# Patient Record
Sex: FEMALE | Race: BLACK OR AFRICAN AMERICAN | Employment: FULL TIME | URBAN - METROPOLITAN AREA
[De-identification: names, ages, dates, MRNs, and addresses within clinical notes are randomized per-mention and may not be internally consistent; named-entity substitution may affect disease eponyms.]

---

## 2017-02-24 ENCOUNTER — TRANSCRIBE ORDERS (OUTPATIENT)
Dept: ADMINISTRATIVE | Facility: HOSPITAL | Age: 57
End: 2017-02-24

## 2017-02-24 DIAGNOSIS — Z12.31 ENCOUNTER FOR MAMMOGRAM TO ESTABLISH BASELINE MAMMOGRAM: Primary | ICD-10-CM

## 2017-02-28 ENCOUNTER — ALLSCRIPTS OFFICE VISIT (OUTPATIENT)
Dept: OTHER | Facility: OTHER | Age: 57
End: 2017-02-28

## 2017-03-02 ENCOUNTER — LAB CONVERSION - ENCOUNTER (OUTPATIENT)
Dept: OTHER | Facility: OTHER | Age: 57
End: 2017-03-02

## 2017-03-02 LAB
ADDITIONAL INFORMATION (HISTORICAL): NORMAL
ADEQUACY: (HISTORICAL): NORMAL
COMMENT (HISTORICAL): NORMAL
CYTOTECHNOLOGIST: (HISTORICAL): NORMAL
HPV MRNA E6/E7 (HISTORICAL): NOT DETECTED
INTERPRETATION (HISTORICAL): NORMAL
LMP (HISTORICAL): NORMAL
PREV. BX: (HISTORICAL): NORMAL
PREV. PAP (HISTORICAL): NORMAL
SOURCE (HISTORICAL): NORMAL

## 2017-03-22 ENCOUNTER — HOSPITAL ENCOUNTER (OUTPATIENT)
Dept: RADIOLOGY | Facility: HOSPITAL | Age: 57
Discharge: HOME | End: 2017-03-22
Attending: OBSTETRICS & GYNECOLOGY
Payer: COMMERCIAL

## 2017-03-22 DIAGNOSIS — Z12.31 ENCOUNTER FOR SCREENING MAMMOGRAM FOR MALIGNANT NEOPLASM OF BREAST: ICD-10-CM

## 2017-03-22 PROCEDURE — G0202 SCR MAMMO BI INCL CAD: HCPCS

## 2018-01-04 ENCOUNTER — TRANSCRIBE ORDERS (OUTPATIENT)
Dept: ADMINISTRATIVE | Facility: HOSPITAL | Age: 58
End: 2018-01-04

## 2018-01-04 DIAGNOSIS — Z12.39 SCREENING BREAST EXAMINATION: Primary | ICD-10-CM

## 2018-01-12 VITALS
SYSTOLIC BLOOD PRESSURE: 128 MMHG | BODY MASS INDEX: 31.4 KG/M2 | WEIGHT: 188.5 LBS | DIASTOLIC BLOOD PRESSURE: 74 MMHG | HEIGHT: 65 IN

## 2018-03-13 ENCOUNTER — OFFICE VISIT (OUTPATIENT)
Dept: OBGYN CLINIC | Facility: CLINIC | Age: 58
End: 2018-03-13
Payer: COMMERCIAL

## 2018-03-13 VITALS
DIASTOLIC BLOOD PRESSURE: 72 MMHG | BODY MASS INDEX: 26.68 KG/M2 | HEIGHT: 66 IN | WEIGHT: 166 LBS | SYSTOLIC BLOOD PRESSURE: 126 MMHG

## 2018-03-13 DIAGNOSIS — Z01.419 PAP SMEAR, AS PART OF ROUTINE GYNECOLOGICAL EXAMINATION: Primary | ICD-10-CM

## 2018-03-13 DIAGNOSIS — Z12.31 VISIT FOR SCREENING MAMMOGRAM: ICD-10-CM

## 2018-03-13 DIAGNOSIS — Z01.419 ENCOUNTER FOR GYNECOLOGICAL EXAMINATION: ICD-10-CM

## 2018-03-13 PROCEDURE — G0145 SCR C/V CYTO,THINLAYER,RESCR: HCPCS | Performed by: OBSTETRICS & GYNECOLOGY

## 2018-03-13 PROCEDURE — 99396 PREV VISIT EST AGE 40-64: CPT | Performed by: OBSTETRICS & GYNECOLOGY

## 2018-03-13 RX ORDER — FLUTICASONE PROPIONATE 50 MCG
1 SPRAY, SUSPENSION (ML) NASAL EVERY 24 HOURS
COMMUNITY
Start: 2013-01-16

## 2018-03-13 RX ORDER — LORATADINE 10 MG/1
TABLET ORAL
COMMUNITY

## 2018-03-13 RX ORDER — ASCORBATE CALCIUM 500 MG
500 TABLET ORAL
COMMUNITY

## 2018-03-13 RX ORDER — NIFEDIPINE 60 MG/1
TABLET, FILM COATED, EXTENDED RELEASE ORAL
COMMUNITY
Start: 2017-12-15

## 2018-03-13 RX ORDER — LISINOPRIL 30 MG/1
TABLET ORAL
COMMUNITY
Start: 2018-01-10

## 2018-03-13 NOTE — PATIENT INSTRUCTIONS
Normal gynecologic evaluation    Self-breast examinations stressed to the patient    Mammogram ordered    Patient will be seen in 1 year for her annual gynecologic medical examination    Patient will call the office should any problems or concerns arise during the interim

## 2018-03-13 NOTE — PROGRESS NOTES
Assessment/Plan:    Assessment:  Annual gynecologic and medical examination    Plan:  Normal gynecologic physical examination today    Self-breast examination with stressed to the patient    Mammogram was ordered    Pap smear was completed    I discussed the importance of regular exercise and good eating habits with the patient  I also reviewed the importance of a multiple vitamin and taking an adequate amount of calcium and vitamin-D  We also discussed the importance of using sun block when out in the [place] for prolonged amount of time    She will be seen in 1 year for her annual gynecologic and medical evaluation    And she was told to call the office should any problems or concerns arise during the interim    Questions were answered in detail for her during the visit      No problem-specific Assessment & Plan notes found for this encounter  Diagnoses and all orders for this visit:    Pap smear, as part of routine gynecological examination  -     Liquid-based pap, screening    Visit for screening mammogram    Encounter for gynecological examination    Other orders  -     Cancel: Mammo screening bilateral w cad; Future  -     loratadine (CLARITIN) 10 mg tablet; Take by mouth  -     Calcium Ascorbate 500 MG TABS; Take 500 mg by mouth  -     Biotin 5 MG TBDP; Take 2 tablets by mouth  -     Calcium Carb-Cholecalciferol (CALCIUM 1000 + D) 1000-800 MG-UNIT TABS; Take by mouth  -     Multiple Vitamin (MULTI VITAMIN DAILY PO); Take by mouth  -     Cholecalciferol 1000 units CHEW; Chew  -     fluticasone (FLONASE) 50 mcg/act nasal spray; 1 spray into each nostril every 24 hours  -     lisinopril (ZESTRIL) 30 mg tablet;   -     NIFEdipine ER (ADALAT CC) 60 MG 24 hr tablet;   -     Cyanocobalamin (VITAMIN B 12 PO); Take by mouth          Subjective:      Patient ID: Markie Betancourt is a 66 y o  female      Patient is a 61-year-old black female who is here today for her annual gynecologic and medical examination    She denies any abnormal vaginal bleeding    She also denies any significant pelvic or abdominal pain    She reports normal bowel and bladder habits    She states that she is caught up with colonoscopies    She reports that she has not had any new medical problems throughout the past year          Gynecologic Exam         The following portions of the patient's history were reviewed and updated as appropriate: allergies, current medications, past family history, past medical history, past social history, past surgical history and problem list     Review of Systems   Constitutional: Negative  HENT: Negative  Eyes: Negative  Respiratory: Negative  Cardiovascular: Negative  Gastrointestinal: Negative  Endocrine: Negative  Genitourinary: Negative  Musculoskeletal: Negative  Skin: Negative  Neurological: Negative  Psychiatric/Behavioral: Negative  Objective:      /72 (BP Location: Right arm, Patient Position: Sitting, Cuff Size: Standard)   Ht 5' 6" (1 676 m)   Wt 75 3 kg (166 lb)   BMI 26 79 kg/m²          Physical Exam   Constitutional: She appears well-developed  HENT:   Head: Normocephalic  Eyes: Pupils are equal, round, and reactive to light  Neck: Normal range of motion  Neck supple  Cardiovascular: Normal rate and regular rhythm  Pulmonary/Chest: Effort normal and breath sounds normal    Abdominal: Soft  Normal appearance and bowel sounds are normal    Genitourinary: Rectum normal, vagina normal and uterus normal  Rectal exam shows guaiac negative stool  Pelvic exam was performed with patient supine  Right adnexum displays no mass, no tenderness and no fullness  Left adnexum displays no mass, no tenderness and no fullness  No vaginal discharge found  Lymphadenopathy:     She has no cervical adenopathy  She has no axillary adenopathy  Right: No inguinal and no supraclavicular adenopathy present          Left: No inguinal and no supraclavicular adenopathy present  Neurological: She is alert  Skin: Skin is intact  No rash noted  Psychiatric: She has a normal mood and affect   Her speech is normal and behavior is normal  Judgment and thought content normal  Cognition and memory are normal

## 2018-03-19 LAB
LAB AP GYN PRIMARY INTERPRETATION: NORMAL
Lab: NORMAL

## 2018-03-26 ENCOUNTER — HOSPITAL ENCOUNTER (OUTPATIENT)
Dept: RADIOLOGY | Facility: HOSPITAL | Age: 58
Discharge: HOME | End: 2018-03-26
Payer: COMMERCIAL

## 2018-03-26 DIAGNOSIS — Z12.39 SCREENING BREAST EXAMINATION: ICD-10-CM

## 2018-03-26 PROCEDURE — 77067 SCR MAMMO BI INCL CAD: CPT

## 2019-04-09 ENCOUNTER — ANNUAL EXAM (OUTPATIENT)
Dept: OBGYN CLINIC | Facility: CLINIC | Age: 59
End: 2019-04-09
Payer: COMMERCIAL

## 2019-04-09 VITALS — BODY MASS INDEX: 26.53 KG/M2 | SYSTOLIC BLOOD PRESSURE: 110 MMHG | WEIGHT: 164.4 LBS | DIASTOLIC BLOOD PRESSURE: 76 MMHG

## 2019-04-09 DIAGNOSIS — Z01.419 PAP SMEAR, AS PART OF ROUTINE GYNECOLOGICAL EXAMINATION: Primary | ICD-10-CM

## 2019-04-09 DIAGNOSIS — Z12.39 BREAST CANCER SCREENING: ICD-10-CM

## 2019-04-09 DIAGNOSIS — Z01.419 ENCOUNTER FOR GYNECOLOGICAL EXAMINATION: ICD-10-CM

## 2019-04-09 PROCEDURE — 99396 PREV VISIT EST AGE 40-64: CPT | Performed by: OBSTETRICS & GYNECOLOGY

## 2019-04-09 RX ORDER — LISINOPRIL 30 MG/1
TABLET ORAL
COMMUNITY
Start: 2019-01-05

## 2019-04-09 RX ORDER — FEXOFENADINE HCL 180 MG/1
180 TABLET ORAL
COMMUNITY

## 2019-04-09 RX ORDER — NIFEDIPINE 60 MG/1
TABLET, FILM COATED, EXTENDED RELEASE ORAL
COMMUNITY
Start: 2018-12-10

## 2019-04-10 LAB
CLINICAL INFO: NORMAL
CYTO CVX: NORMAL
DATE PREVIOUS BX: NORMAL
LMP START DATE: NORMAL
SL AMB PREV. PAP:: NORMAL
SPECIMEN SOURCE CVX/VAG CYTO: NORMAL

## 2022-08-11 ENCOUNTER — OFFICE VISIT (OUTPATIENT)
Dept: OBGYN CLINIC | Facility: CLINIC | Age: 62
End: 2022-08-11
Payer: COMMERCIAL

## 2022-08-11 VITALS
SYSTOLIC BLOOD PRESSURE: 138 MMHG | DIASTOLIC BLOOD PRESSURE: 90 MMHG | BODY MASS INDEX: 30.63 KG/M2 | WEIGHT: 190.6 LBS | HEIGHT: 66 IN | HEART RATE: 80 BPM

## 2022-08-11 DIAGNOSIS — M16.11 ARTHRITIS OF RIGHT HIP: Primary | ICD-10-CM

## 2022-08-11 DIAGNOSIS — Z01.818 PREOPERATIVE TESTING: ICD-10-CM

## 2022-08-11 DIAGNOSIS — Z01.818 ENCOUNTER FOR PREADMISSION TESTING: ICD-10-CM

## 2022-08-11 PROCEDURE — 99204 OFFICE O/P NEW MOD 45 MIN: CPT | Performed by: ORTHOPAEDIC SURGERY

## 2022-08-11 RX ORDER — MULTIVITAMIN
1 TABLET ORAL DAILY
Qty: 30 TABLET | Refills: 1 | Status: SHIPPED | OUTPATIENT
Start: 2022-08-11 | End: 2022-08-26

## 2022-08-11 RX ORDER — ASCORBIC ACID 500 MG
500 TABLET ORAL DAILY
Qty: 30 TABLET | Refills: 1 | Status: SHIPPED | OUTPATIENT
Start: 2022-08-11

## 2022-08-11 RX ORDER — FOLIC ACID 1 MG/1
1 TABLET ORAL DAILY
Qty: 30 TABLET | Refills: 1 | Status: SHIPPED | OUTPATIENT
Start: 2022-08-11

## 2022-08-11 RX ORDER — FERROUS SULFATE TAB EC 324 MG (65 MG FE EQUIVALENT) 324 (65 FE) MG
324 TABLET DELAYED RESPONSE ORAL
Qty: 30 TABLET | Refills: 1 | Status: SHIPPED | OUTPATIENT
Start: 2022-08-11

## 2022-08-11 NOTE — PROGRESS NOTES
Assessment/Plan     1  Arthritis of right hip    2  Preoperative testing    3  Encounter for preadmission testing      Orders Placed This Encounter   Procedures    XR hip/pelv 2-3 vws right if performed    HEMOGLOBIN A1C W/ EAG ESTIMATION    Comprehensive metabolic panel    Hemoglobin A1C W/EAG Estimation    CBC and differential    Anemia Panel w/Reflex    Protime-INR    APTT    Ambulatory referral to Physical Therapy    Ambulatory referral to Cardiology    Type and screen     Edison Leong has severe right hip arthritis  she has tried conservative treatment without adequate relief  We discussed treatment options as well as risks and benefits of treatment options  The patient would like to proceed with a right Anterior  total hip arthroplasty  The risks of surgery include, but are not limited to infection, blood clot, wound healing problems, blood loss, damage to blood vessels and nerves, persistent pain and stiffness, dislocation, fracture, leg-length discrepancy, need for additional surgery, need for revision surgery, failure of hardware, heart attack, stroke, death  The patient understood and agreed to by oral and written consent  I answered all questions regarding surgery (potential same day surgery)   The patient will be on  BID  for DVT prophylaxis  · The patient will obtain clearance from their PCP and cardiology( she obtained clearance already  · She will be on vitamins prior to surgery   · She does have history of constipation after having right total knee arthroplasty   · She had her recent blood work done at [facility]   Return for PO Right anterior BRENNON   I answered all of the patient's questions during the visit and provided education of the patient's condition during the visit  The patient verbalized understanding of the information given and agrees with the plan  This note was dictated using [employer] software  It may contain errors including improperly dictated words  Please contact physician directly for any questions  History of Present Illness   Chief complaint:   Chief Complaint   Patient presents with    Right Hip - Pain       HPI: Suleiman Banegas is a 63 y o  female that c/o right hip pain  She was referred by her PCP Dr Randy Campbell  She was treating with Dr Violet Pierre at [address] and has history of left total hip posterior arthroplasty 5/2021 and right total knee arthroplasty 6/2020  She was scheduled for right total hip arthroplasty on Monday 8/15/22 but got canceled due to her insurance not covered under Dr Manisha Coon  She is having pain that comes and goes over the lateral right hip and occasional has right knee pain  Pain is worse with prolong weight bearing and with activities  She feels her left leg is longer and feels off balance when walking  She feels the pain is limited her from daily activities of living  She did go to physical therapy for almost 2 years with no relief  She was taking Aleve for pain but stopped per her PCP due to causing her blood pressure to go up  She has taken IBU with no issues with her blood pressure  She is taking Tylenol arthritis with relief  She has no history of having injections or surgeries on the right hip  History of MRSA: no  History of blood clots: no  Family history of blood clots: no  History of Hepatitis C:no  History of HIV: no  Are you a smoker:no  Are you diabetic:no  Do you see a cardiologist: yes/ pre op clearance   Have you been vaccinated for COVID:yes  Have you seen a dentist in the past year: yes  Do you have a leg length discrepancy:  Yes/ right leg feels shorter              ROS:    See HPI for musculoskeletal review     All other systems reviewed are negative     Historical Information   Past Medical History:   Diagnosis Date    Spontaneous miscarriage      Past Surgical History:   Procedure Laterality Date    FOOT SURGERY      TOTAL ABDOMINAL HYSTERECTOMY  11/2007    with removal of both ovaries     Social History   Social History     Substance and Sexual Activity   Alcohol Use Yes    Comment: Social use     Social History     Substance and Sexual Activity   Drug Use No     Social History     Tobacco Use   Smoking Status Former Smoker   Smokeless Tobacco Never Used     Family History:   Family History   Problem Relation Age of Onset    Other Father         Epilepsy    Other Sister         Cyst, breast; Thyroid cyst    Thyroid disease Sister     Heart failure Maternal Grandfather     Stroke Maternal Grandfather     Diabetes Family     Heart disease Family        Current Outpatient Medications on File Prior to Visit   Medication Sig Dispense Refill    Biotin 5 MG TBDP Take 2 tablets by mouth      Calcium Ascorbate 500 MG TABS Take 500 mg by mouth      Calcium Carb-Cholecalciferol (CALCIUM 1000 + D) 1000-800 MG-UNIT TABS Take by mouth      Cholecalciferol 1000 units CHEW Chew      Cholecalciferol 1000 units CHEW Chew      Cyanocobalamin (VITAMIN B 12 PO) Take by mouth      fexofenadine (ALLEGRA) 180 MG tablet Take 180 mg by mouth      fluticasone (FLONASE) 50 mcg/act nasal spray 1 spray into each nostril every 24 hours      lisinopril (ZESTRIL) 30 mg tablet       lisinopril (ZESTRIL) 30 mg tablet TAKE 1 TABLET DAILY      loratadine (CLARITIN) 10 mg tablet Take by mouth      LUTEIN PO Take by mouth      Multiple Vitamin (MULTI VITAMIN DAILY PO) Take by mouth      NIFEdipine ER (ADALAT CC) 60 MG 24 hr tablet       NIFEdipine ER (ADALAT CC) 60 MG 24 hr tablet TAKE 1 TABLET DAILY      Omega-3 Fatty Acids (FISH OIL PO) Take 2 g by mouth       No current facility-administered medications on file prior to visit  No Known Allergies    Objective   Vitals: Blood pressure 138/90, pulse 80, height 5' 5 5" (1 664 m), weight 86 5 kg (190 lb 9 6 oz)  ,Body mass index is 31 24 kg/m²      PE:  AAOx 3  WDWN  Hearing intact, no drainage from eyes  Regular rate  no audible wheezing  no abdominal distension  LE compartments soft, skin intact    right hip:   No dislocation/deformity  ROM:FF 0-90  ER 0-5   IR -5    Leg lengths:  a cm shorter on the right with 10 degrees flexion contracture         Imaging Studies: I have personally reviewed pertinent films in PACS  righthip:  Severe DJD

## 2022-08-11 NOTE — H&P (VIEW-ONLY)
Assessment/Plan     1  Arthritis of right hip    2  Preoperative testing    3  Encounter for preadmission testing      Orders Placed This Encounter   Procedures    XR hip/pelv 2-3 vws right if performed    HEMOGLOBIN A1C W/ EAG ESTIMATION    Comprehensive metabolic panel    Hemoglobin A1C W/EAG Estimation    CBC and differential    Anemia Panel w/Reflex    Protime-INR    APTT    Ambulatory referral to Physical Therapy    Ambulatory referral to Cardiology    Type and screen     Michael Lofton has severe right hip arthritis  she has tried conservative treatment without adequate relief  We discussed treatment options as well as risks and benefits of treatment options  The patient would like to proceed with a right Anterior  total hip arthroplasty  The risks of surgery include, but are not limited to infection, blood clot, wound healing problems, blood loss, damage to blood vessels and nerves, persistent pain and stiffness, dislocation, fracture, leg-length discrepancy, need for additional surgery, need for revision surgery, failure of hardware, heart attack, stroke, death  The patient understood and agreed to by oral and written consent  I answered all questions regarding surgery (potential same day surgery)   The patient will be on  BID  for DVT prophylaxis  · The patient will obtain clearance from their PCP and cardiology( she obtained clearance already  · She will be on vitamins prior to surgery   · She does have history of constipation after having right total knee arthroplasty   · She had her recent blood work done at [facility]   Return for PO Right anterior BRENNON   I answered all of the patient's questions during the visit and provided education of the patient's condition during the visit  The patient verbalized understanding of the information given and agrees with the plan  This note was dictated using [employer] software  It may contain errors including improperly dictated words  Please contact physician directly for any questions  History of Present Illness   Chief complaint:   Chief Complaint   Patient presents with    Right Hip - Pain       HPI: Suleiman Banegas is a 63 y o  female that c/o right hip pain  She was referred by her PCP Dr Randy Campbell  She was treating with Dr Violet Pierre at [address] and has history of left total hip posterior arthroplasty 5/2021 and right total knee arthroplasty 6/2020  She was scheduled for right total hip arthroplasty on Monday 8/15/22 but got canceled due to her insurance not covered under Dr Manisha Coon  She is having pain that comes and goes over the lateral right hip and occasional has right knee pain  Pain is worse with prolong weight bearing and with activities  She feels her left leg is longer and feels off balance when walking  She feels the pain is limited her from daily activities of living  She did go to physical therapy for almost 2 years with no relief  She was taking Aleve for pain but stopped per her PCP due to causing her blood pressure to go up  She has taken IBU with no issues with her blood pressure  She is taking Tylenol arthritis with relief  She has no history of having injections or surgeries on the right hip  History of MRSA: no  History of blood clots: no  Family history of blood clots: no  History of Hepatitis C:no  History of HIV: no  Are you a smoker:no  Are you diabetic:no  Do you see a cardiologist: yes/ pre op clearance   Have you been vaccinated for COVID:yes  Have you seen a dentist in the past year: yes  Do you have a leg length discrepancy:  Yes/ right leg feels shorter              ROS:    See HPI for musculoskeletal review     All other systems reviewed are negative     Historical Information   Past Medical History:   Diagnosis Date    Spontaneous miscarriage      Past Surgical History:   Procedure Laterality Date    FOOT SURGERY      TOTAL ABDOMINAL HYSTERECTOMY  11/2007    with removal of both ovaries     Social History   Social History     Substance and Sexual Activity   Alcohol Use Yes    Comment: Social use     Social History     Substance and Sexual Activity   Drug Use No     Social History     Tobacco Use   Smoking Status Former Smoker   Smokeless Tobacco Never Used     Family History:   Family History   Problem Relation Age of Onset    Other Father         Epilepsy    Other Sister         Cyst, breast; Thyroid cyst    Thyroid disease Sister     Heart failure Maternal Grandfather     Stroke Maternal Grandfather     Diabetes Family     Heart disease Family        Current Outpatient Medications on File Prior to Visit   Medication Sig Dispense Refill    Biotin 5 MG TBDP Take 2 tablets by mouth      Calcium Ascorbate 500 MG TABS Take 500 mg by mouth      Calcium Carb-Cholecalciferol (CALCIUM 1000 + D) 1000-800 MG-UNIT TABS Take by mouth      Cholecalciferol 1000 units CHEW Chew      Cholecalciferol 1000 units CHEW Chew      Cyanocobalamin (VITAMIN B 12 PO) Take by mouth      fexofenadine (ALLEGRA) 180 MG tablet Take 180 mg by mouth      fluticasone (FLONASE) 50 mcg/act nasal spray 1 spray into each nostril every 24 hours      lisinopril (ZESTRIL) 30 mg tablet       lisinopril (ZESTRIL) 30 mg tablet TAKE 1 TABLET DAILY      loratadine (CLARITIN) 10 mg tablet Take by mouth      LUTEIN PO Take by mouth      Multiple Vitamin (MULTI VITAMIN DAILY PO) Take by mouth      NIFEdipine ER (ADALAT CC) 60 MG 24 hr tablet       NIFEdipine ER (ADALAT CC) 60 MG 24 hr tablet TAKE 1 TABLET DAILY      Omega-3 Fatty Acids (FISH OIL PO) Take 2 g by mouth       No current facility-administered medications on file prior to visit  No Known Allergies    Objective   Vitals: Blood pressure 138/90, pulse 80, height 5' 5 5" (1 664 m), weight 86 5 kg (190 lb 9 6 oz)  ,Body mass index is 31 24 kg/m²      PE:  AAOx 3  WDWN  Hearing intact, no drainage from eyes  Regular rate  no audible wheezing  no abdominal distension  LE compartments soft, skin intact    right hip:   No dislocation/deformity  ROM:FF 0-90  ER 0-5   IR -5    Leg lengths:  a cm shorter on the right with 10 degrees flexion contracture         Imaging Studies: I have personally reviewed pertinent films in PACS  righthip:  Severe DJD

## 2022-08-11 NOTE — PROGRESS NOTES
Assessment/Plan     No diagnosis found  No orders of the defined types were placed in this encounter  ·     No follow-ups on file  I answered all of the patient's questions during the visit and provided education of the patient's condition during the visit  The patient verbalized understanding of the information given and agrees with the plan  This note was dictated using [employer] software  It may contain errors including improperly dictated words  Please contact physician directly for any questions  History of Present Illness   Chief complaint:   Chief Complaint   Patient presents with    Right Hip - Pain       HPI: Daiana Rios is a 63 y o  female that c/o right hip pain  ROS:    See HPI for musculoskeletal review     All other systems reviewed are negative     Historical Information   Past Medical History:   Diagnosis Date    Spontaneous miscarriage      Past Surgical History:   Procedure Laterality Date    FOOT SURGERY      TOTAL ABDOMINAL HYSTERECTOMY  11/2007    with removal of both ovaries     Social History   Social History     Substance and Sexual Activity   Alcohol Use Yes    Comment: Social use     Social History     Substance and Sexual Activity   Drug Use No     Social History     Tobacco Use   Smoking Status Former Smoker   Smokeless Tobacco Never Used     Family History:   Family History   Problem Relation Age of Onset    Other Father         Epilepsy    Other Sister         Cyst, breast; Thyroid cyst    Thyroid disease Sister     Heart failure Maternal Grandfather     Stroke Maternal Grandfather     Diabetes Family     Heart disease Family        Current Outpatient Medications on File Prior to Visit   Medication Sig Dispense Refill    Biotin 5 MG TBDP Take 2 tablets by mouth      Calcium Ascorbate 500 MG TABS Take 500 mg by mouth      Calcium Carb-Cholecalciferol (CALCIUM 1000 + D) 1000-800 MG-UNIT TABS Take by mouth      Cholecalciferol 1000 units CHEW Chew  Cholecalciferol 1000 units CHEW Chew      Cyanocobalamin (VITAMIN B 12 PO) Take by mouth      fexofenadine (ALLEGRA) 180 MG tablet Take 180 mg by mouth      fluticasone (FLONASE) 50 mcg/act nasal spray 1 spray into each nostril every 24 hours      lisinopril (ZESTRIL) 30 mg tablet       lisinopril (ZESTRIL) 30 mg tablet TAKE 1 TABLET DAILY      loratadine (CLARITIN) 10 mg tablet Take by mouth      LUTEIN PO Take by mouth      Multiple Vitamin (MULTI VITAMIN DAILY PO) Take by mouth      NIFEdipine ER (ADALAT CC) 60 MG 24 hr tablet       NIFEdipine ER (ADALAT CC) 60 MG 24 hr tablet TAKE 1 TABLET DAILY      Omega-3 Fatty Acids (FISH OIL PO) Take 2 g by mouth       No current facility-administered medications on file prior to visit  No Known Allergies    Objective   Vitals: There were no vitals taken for this visit  ,There is no height or weight on file to calculate BMI  PE:  AAOx 3  WDWN  Hearing intact, no drainage from eyes  Regular rate  no audible wheezing  no abdominal distension  LE compartments soft, skin intact    right hip:   No dislocation/deformity  Neg  Stinchfield  ROM: full  Neg  Pearl Test  Neg  Impingement test  No TTP over greater trochanter  Abduction: 5/5  Neg  Ahsan's test  No TTP over SIJ    rightLE:    Sensation grossly intact ***  Palpable *** pulse  AT/GS intact    Back:    No TTP over lumbar spinous processes, paraspinal musculature  SLR: Neg       Imaging Studies: {Results Review Statement:97487}  {right/left/bilateral:14112}hip:        Scribe Attestation    I,:   am acting as a scribe while in the presence of the attending physician :       I,:   personally performed the services described in this documentation    as scribed in my presence :

## 2022-08-12 ENCOUNTER — TELEPHONE (OUTPATIENT)
Dept: OBGYN CLINIC | Facility: HOSPITAL | Age: 62
End: 2022-08-12

## 2022-08-12 NOTE — TELEPHONE ENCOUNTER
Dr Leigh nicole    Patient has questions about the forms she was given asking questions about her home  Virtual Home Assessment

## 2022-08-12 NOTE — TELEPHONE ENCOUNTER
Patient called, she would like to speak with a surgery scheduler for Dr Chrissy Torres  She she's the doctor in the [place] [address] office      CB# [phone]

## 2022-08-15 ENCOUNTER — APPOINTMENT (OUTPATIENT)
Dept: LAB | Facility: CLINIC | Age: 62
End: 2022-08-15
Payer: COMMERCIAL

## 2022-08-15 DIAGNOSIS — M16.11 ARTHRITIS OF RIGHT HIP: ICD-10-CM

## 2022-08-15 LAB
EST. AVERAGE GLUCOSE BLD GHB EST-MCNC: 117 MG/DL
HBA1C MFR BLD: 5.7 %

## 2022-08-15 PROCEDURE — 83036 HEMOGLOBIN GLYCOSYLATED A1C: CPT

## 2022-08-15 PROCEDURE — 36415 COLL VENOUS BLD VENIPUNCTURE: CPT

## 2022-08-19 RX ORDER — CEFAZOLIN SODIUM 2 G/50ML
2000 SOLUTION INTRAVENOUS ONCE
Status: CANCELLED | OUTPATIENT
Start: 2022-08-24 | End: 2022-08-19

## 2022-08-19 RX ORDER — GABAPENTIN 100 MG/1
300 CAPSULE ORAL ONCE
Status: CANCELLED | OUTPATIENT
Start: 2022-08-24 | End: 2022-08-19

## 2022-08-19 RX ORDER — SODIUM CHLORIDE 9 MG/ML
75 INJECTION, SOLUTION INTRAVENOUS CONTINUOUS
Status: CANCELLED | OUTPATIENT
Start: 2022-08-24

## 2022-08-19 RX ORDER — CHLORHEXIDINE GLUCONATE 0.12 MG/ML
15 RINSE ORAL ONCE
Status: CANCELLED | OUTPATIENT
Start: 2022-08-24 | End: 2022-08-19

## 2022-08-19 RX ORDER — ACETAMINOPHEN 325 MG/1
975 TABLET ORAL ONCE
Status: CANCELLED | OUTPATIENT
Start: 2022-08-24 | End: 2022-08-19

## 2022-08-19 RX ORDER — CHLORHEXIDINE GLUCONATE 4 G/100ML
SOLUTION TOPICAL DAILY PRN
Status: CANCELLED | OUTPATIENT
Start: 2022-08-19

## 2022-08-23 ENCOUNTER — TELEPHONE (OUTPATIENT)
Dept: PAIN MEDICINE | Facility: CLINIC | Age: 62
End: 2022-08-23

## 2022-08-23 ENCOUNTER — ANESTHESIA EVENT (OUTPATIENT)
Dept: PERIOP | Facility: HOSPITAL | Age: 62
End: 2022-08-23
Payer: COMMERCIAL

## 2022-08-23 ENCOUNTER — TELEPHONE (OUTPATIENT)
Dept: OBGYN CLINIC | Facility: HOSPITAL | Age: 62
End: 2022-08-23

## 2022-08-23 RX ORDER — SIMVASTATIN 20 MG
20 TABLET ORAL
COMMUNITY

## 2022-08-23 NOTE — TELEPHONE ENCOUNTER
Patient had EKG done through [facility]  which is [facility]  Was seen by Internal Medicine Dr Ez Herzog for surgery clearance and he did not indicate the necessity for cardiac clearance due to low risk after bloodwork and EKG  Dr Cande Pablo is in agreement to proceeding with surgery after reviewing the Internal Medicine medical clearance

## 2022-08-23 NOTE — PRE-PROCEDURE INSTRUCTIONS
Pre-Surgery Instructions:   Medication Instructions    ascorbic acid (VITAMIN C) 500 MG tablet Hold day of surgery   ferrous sulfate 324 (65 Fe) mg Hold day of surgery   fluticasone (FLONASE) 50 mcg/act nasal spray Hold day of surgery   folic acid (KP Folic Acid) 1 mg tablet Hold day of surgery   lisinopril (ZESTRIL) 30 mg tablet Hold day of surgery   Multiple Vitamin (multivitamin) tablet Hold day of surgery   NIFEdipine ER (ADALAT CC) 60 MG 24 hr tablet Take day of surgery   simvastatin (ZOCOR) 20 mg tablet Take day of surgery  Pre procedure instructions reviewed verbalizes understanding  NPO after MN  Bathing reviewed  Morning meds with water  No NSAIDS  Ortho Class Reviewed   All questions answered

## 2022-08-24 ENCOUNTER — APPOINTMENT (OUTPATIENT)
Dept: RADIOLOGY | Facility: HOSPITAL | Age: 62
End: 2022-08-24
Payer: COMMERCIAL

## 2022-08-24 ENCOUNTER — ANESTHESIA (OUTPATIENT)
Dept: PERIOP | Facility: HOSPITAL | Age: 62
End: 2022-08-24
Payer: COMMERCIAL

## 2022-08-24 ENCOUNTER — HOSPITAL ENCOUNTER (OUTPATIENT)
Facility: HOSPITAL | Age: 62
Setting detail: OUTPATIENT SURGERY
Discharge: HOME | End: 2022-08-26
Attending: ORTHOPAEDIC SURGERY | Admitting: ORTHOPAEDIC SURGERY
Payer: COMMERCIAL

## 2022-08-24 DIAGNOSIS — M16.11 ARTHRITIS OF RIGHT HIP: ICD-10-CM

## 2022-08-24 DIAGNOSIS — Z96.641 STATUS POST TOTAL HIP REPLACEMENT, RIGHT: ICD-10-CM

## 2022-08-24 DIAGNOSIS — E78.5 HYPERLIPIDEMIA, UNSPECIFIED HYPERLIPIDEMIA TYPE: Primary | ICD-10-CM

## 2022-08-24 DIAGNOSIS — I10 HYPERTENSION, UNSPECIFIED TYPE: ICD-10-CM

## 2022-08-24 LAB
ABO GROUP BLD: NORMAL
ABO GROUP BLD: NORMAL
ANION GAP SERPL CALCULATED.3IONS-SCNC: 9 MMOL/L (ref 4–13)
BLD GP AB SCN SERPL QL: NEGATIVE
BUN SERPL-MCNC: 16 MG/DL (ref 5–25)
CALCIUM SERPL-MCNC: 7.6 MG/DL (ref 8.3–10.1)
CHLORIDE SERPL-SCNC: 108 MMOL/L (ref 96–108)
CO2 SERPL-SCNC: 28 MMOL/L (ref 21–32)
CREAT SERPL-MCNC: 1.02 MG/DL (ref 0.6–1.3)
FLUAV RNA RESP QL NAA+PROBE: NEGATIVE
FLUBV RNA RESP QL NAA+PROBE: NEGATIVE
GFR SERPL CREATININE-BSD FRML MDRD: 59 ML/MIN/1.73SQ M
GLUCOSE P FAST SERPL-MCNC: 101 MG/DL (ref 65–99)
GLUCOSE SERPL-MCNC: 101 MG/DL (ref 65–140)
POTASSIUM SERPL-SCNC: 3.5 MMOL/L (ref 3.5–5.3)
RH BLD: POSITIVE
RH BLD: POSITIVE
RSV RNA RESP QL NAA+PROBE: NEGATIVE
SARS-COV-2 RNA RESP QL NAA+PROBE: NEGATIVE
SODIUM SERPL-SCNC: 145 MMOL/L (ref 135–147)
SPECIMEN EXPIRATION DATE: NORMAL

## 2022-08-24 PROCEDURE — 80048 BASIC METABOLIC PNL TOTAL CA: CPT | Performed by: PHYSICIAN ASSISTANT

## 2022-08-24 PROCEDURE — 27130 TOTAL HIP ARTHROPLASTY: CPT | Performed by: ORTHOPAEDIC SURGERY

## 2022-08-24 PROCEDURE — C1776 JOINT DEVICE (IMPLANTABLE): HCPCS | Performed by: ORTHOPAEDIC SURGERY

## 2022-08-24 PROCEDURE — 0241U HB NFCT DS VIR RESP RNA 4 TRGT: CPT | Performed by: ORTHOPAEDIC SURGERY

## 2022-08-24 PROCEDURE — 73501 X-RAY EXAM HIP UNI 1 VIEW: CPT

## 2022-08-24 PROCEDURE — C1713 ANCHOR/SCREW BN/BN,TIS/BN: HCPCS | Performed by: ORTHOPAEDIC SURGERY

## 2022-08-24 PROCEDURE — 86900 BLOOD TYPING SEROLOGIC ABO: CPT | Performed by: ORTHOPAEDIC SURGERY

## 2022-08-24 PROCEDURE — 86850 RBC ANTIBODY SCREEN: CPT | Performed by: ORTHOPAEDIC SURGERY

## 2022-08-24 PROCEDURE — 27130 TOTAL HIP ARTHROPLASTY: CPT | Performed by: PHYSICIAN ASSISTANT

## 2022-08-24 PROCEDURE — 86901 BLOOD TYPING SEROLOGIC RH(D): CPT | Performed by: ORTHOPAEDIC SURGERY

## 2022-08-24 PROCEDURE — 73502 X-RAY EXAM HIP UNI 2-3 VIEWS: CPT

## 2022-08-24 PROCEDURE — 86920 COMPATIBILITY TEST SPIN: CPT

## 2022-08-24 DEVICE — PINNACLE HIP SOLUTIONS ALTRX POLYETHYLENE ACETABULAR LINER NEUTRAL 32MM ID 48MM OD
Type: IMPLANTABLE DEVICE | Site: HIP | Status: FUNCTIONAL
Brand: PINNACLE ALTRX

## 2022-08-24 DEVICE — PINNACLE POROCOAT ACETABULAR SHELL SECTOR II 48MM OD
Type: IMPLANTABLE DEVICE | Site: HIP | Status: FUNCTIONAL
Brand: PINNACLE POROCOAT

## 2022-08-24 DEVICE — PINNACLE CANCELLOUS BONE SCREW 6.5MM X 20MM
Type: IMPLANTABLE DEVICE | Site: HIP | Status: FUNCTIONAL
Brand: PINNACLE

## 2022-08-24 DEVICE — ACTIS DUOFIX HIP PROSTHESIS (FEMORAL STEM 12/14 TAPER CEMENTLESS SIZE 4 HIGH COLLAR)  CE
Type: IMPLANTABLE DEVICE | Site: HIP | Status: FUNCTIONAL
Brand: ACTIS

## 2022-08-24 DEVICE — BIOLOX DELTA CERAMIC FEMORAL HEAD 32MM DIA +1 12/14 TAPER
Type: IMPLANTABLE DEVICE | Site: HIP | Status: FUNCTIONAL
Brand: BIOLOX DELTA

## 2022-08-24 RX ORDER — SODIUM CHLORIDE 9 MG/ML
125 INJECTION, SOLUTION INTRAVENOUS CONTINUOUS
Status: DISCONTINUED | OUTPATIENT
Start: 2022-08-24 | End: 2022-08-24

## 2022-08-24 RX ORDER — SODIUM CHLORIDE, SODIUM LACTATE, POTASSIUM CHLORIDE, CALCIUM CHLORIDE 600; 310; 30; 20 MG/100ML; MG/100ML; MG/100ML; MG/100ML
INJECTION, SOLUTION INTRAVENOUS CONTINUOUS PRN
Status: DISCONTINUED | OUTPATIENT
Start: 2022-08-24 | End: 2022-08-24

## 2022-08-24 RX ORDER — ASCORBIC ACID 500 MG
500 TABLET ORAL DAILY
Status: DISCONTINUED | OUTPATIENT
Start: 2022-08-24 | End: 2022-08-26 | Stop reason: HOSPADM

## 2022-08-24 RX ORDER — ACETAMINOPHEN 325 MG/1
975 TABLET ORAL ONCE
Status: COMPLETED | OUTPATIENT
Start: 2022-08-24 | End: 2022-08-24

## 2022-08-24 RX ORDER — OXYCODONE HYDROCHLORIDE 10 MG/1
10 TABLET ORAL EVERY 4 HOURS PRN
Status: DISCONTINUED | OUTPATIENT
Start: 2022-08-24 | End: 2022-08-26 | Stop reason: HOSPADM

## 2022-08-24 RX ORDER — MIDAZOLAM HYDROCHLORIDE 2 MG/2ML
INJECTION, SOLUTION INTRAMUSCULAR; INTRAVENOUS AS NEEDED
Status: DISCONTINUED | OUTPATIENT
Start: 2022-08-24 | End: 2022-08-24

## 2022-08-24 RX ORDER — MAGNESIUM HYDROXIDE 1200 MG/15ML
LIQUID ORAL AS NEEDED
Status: DISCONTINUED | OUTPATIENT
Start: 2022-08-24 | End: 2022-08-24 | Stop reason: HOSPADM

## 2022-08-24 RX ORDER — PROPOFOL 10 MG/ML
INJECTION, EMULSION INTRAVENOUS CONTINUOUS PRN
Status: DISCONTINUED | OUTPATIENT
Start: 2022-08-24 | End: 2022-08-24

## 2022-08-24 RX ORDER — CHLORHEXIDINE GLUCONATE 0.12 MG/ML
15 RINSE ORAL ONCE
Status: COMPLETED | OUTPATIENT
Start: 2022-08-24 | End: 2022-08-24

## 2022-08-24 RX ORDER — CEFAZOLIN SODIUM 2 G/50ML
2000 SOLUTION INTRAVENOUS ONCE
Status: COMPLETED | OUTPATIENT
Start: 2022-08-24 | End: 2022-08-24

## 2022-08-24 RX ORDER — CHLORHEXIDINE GLUCONATE 4 G/100ML
SOLUTION TOPICAL DAILY PRN
Status: DISCONTINUED | OUTPATIENT
Start: 2022-08-24 | End: 2022-08-24 | Stop reason: HOSPADM

## 2022-08-24 RX ORDER — CALCIUM CARBONATE 200(500)MG
1000 TABLET,CHEWABLE ORAL DAILY PRN
Status: DISCONTINUED | OUTPATIENT
Start: 2022-08-24 | End: 2022-08-26 | Stop reason: HOSPADM

## 2022-08-24 RX ORDER — ONDANSETRON 2 MG/ML
4 INJECTION INTRAMUSCULAR; INTRAVENOUS EVERY 6 HOURS PRN
Status: DISCONTINUED | OUTPATIENT
Start: 2022-08-24 | End: 2022-08-26 | Stop reason: HOSPADM

## 2022-08-24 RX ORDER — CHLORAL HYDRATE 500 MG
2000 CAPSULE ORAL DAILY
Status: DISCONTINUED | OUTPATIENT
Start: 2022-08-24 | End: 2022-08-26 | Stop reason: HOSPADM

## 2022-08-24 RX ORDER — DOCUSATE SODIUM 100 MG/1
100 CAPSULE, LIQUID FILLED ORAL 2 TIMES DAILY
Status: DISCONTINUED | OUTPATIENT
Start: 2022-08-24 | End: 2022-08-26 | Stop reason: HOSPADM

## 2022-08-24 RX ORDER — CEFAZOLIN SODIUM 2 G/50ML
2000 SOLUTION INTRAVENOUS EVERY 8 HOURS
Status: COMPLETED | OUTPATIENT
Start: 2022-08-24 | End: 2022-08-24

## 2022-08-24 RX ORDER — NIFEDIPINE 60 MG/1
60 TABLET, EXTENDED RELEASE ORAL DAILY
Status: DISCONTINUED | OUTPATIENT
Start: 2022-08-25 | End: 2022-08-26 | Stop reason: HOSPADM

## 2022-08-24 RX ORDER — OXYCODONE HYDROCHLORIDE 5 MG/1
5 TABLET ORAL EVERY 4 HOURS PRN
Status: DISCONTINUED | OUTPATIENT
Start: 2022-08-24 | End: 2022-08-26 | Stop reason: HOSPADM

## 2022-08-24 RX ORDER — PRAVASTATIN SODIUM 40 MG
40 TABLET ORAL
Status: DISCONTINUED | OUTPATIENT
Start: 2022-08-24 | End: 2022-08-26 | Stop reason: HOSPADM

## 2022-08-24 RX ORDER — SENNOSIDES 8.6 MG
1 TABLET ORAL
Status: DISCONTINUED | OUTPATIENT
Start: 2022-08-24 | End: 2022-08-26 | Stop reason: HOSPADM

## 2022-08-24 RX ORDER — TRANEXAMIC ACID 100 MG/ML
INJECTION, SOLUTION INTRAVENOUS AS NEEDED
Status: DISCONTINUED | OUTPATIENT
Start: 2022-08-24 | End: 2022-08-24

## 2022-08-24 RX ORDER — FOLIC ACID 1 MG/1
1 TABLET ORAL DAILY
Status: DISCONTINUED | OUTPATIENT
Start: 2022-08-24 | End: 2022-08-26 | Stop reason: HOSPADM

## 2022-08-24 RX ORDER — ONDANSETRON 2 MG/ML
4 INJECTION INTRAMUSCULAR; INTRAVENOUS ONCE AS NEEDED
Status: DISCONTINUED | OUTPATIENT
Start: 2022-08-24 | End: 2022-08-24 | Stop reason: HOSPADM

## 2022-08-24 RX ORDER — ENOXAPARIN SODIUM 100 MG/ML
40 INJECTION SUBCUTANEOUS DAILY
Status: DISCONTINUED | OUTPATIENT
Start: 2022-08-24 | End: 2022-08-26 | Stop reason: HOSPADM

## 2022-08-24 RX ORDER — ONDANSETRON 2 MG/ML
INJECTION INTRAMUSCULAR; INTRAVENOUS AS NEEDED
Status: DISCONTINUED | OUTPATIENT
Start: 2022-08-24 | End: 2022-08-24

## 2022-08-24 RX ORDER — EPHEDRINE SULFATE 50 MG/ML
INJECTION INTRAVENOUS AS NEEDED
Status: DISCONTINUED | OUTPATIENT
Start: 2022-08-24 | End: 2022-08-24

## 2022-08-24 RX ORDER — HYDROMORPHONE HCL/PF 1 MG/ML
0.5 SYRINGE (ML) INJECTION
Status: DISCONTINUED | OUTPATIENT
Start: 2022-08-24 | End: 2022-08-24 | Stop reason: HOSPADM

## 2022-08-24 RX ORDER — SODIUM CHLORIDE, SODIUM LACTATE, POTASSIUM CHLORIDE, CALCIUM CHLORIDE 600; 310; 30; 20 MG/100ML; MG/100ML; MG/100ML; MG/100ML
125 INJECTION, SOLUTION INTRAVENOUS CONTINUOUS
Status: DISCONTINUED | OUTPATIENT
Start: 2022-08-24 | End: 2022-08-26 | Stop reason: HOSPADM

## 2022-08-24 RX ORDER — SODIUM CHLORIDE 9 MG/ML
75 INJECTION, SOLUTION INTRAVENOUS CONTINUOUS
Status: DISCONTINUED | OUTPATIENT
Start: 2022-08-24 | End: 2022-08-24

## 2022-08-24 RX ORDER — LORATADINE 10 MG/1
10 TABLET ORAL DAILY
Status: DISCONTINUED | OUTPATIENT
Start: 2022-08-24 | End: 2022-08-26 | Stop reason: HOSPADM

## 2022-08-24 RX ORDER — GABAPENTIN 100 MG/1
100 CAPSULE ORAL EVERY 8 HOURS
Status: DISCONTINUED | OUTPATIENT
Start: 2022-08-24 | End: 2022-08-26 | Stop reason: HOSPADM

## 2022-08-24 RX ORDER — BUPIVACAINE HYDROCHLORIDE 7.5 MG/ML
INJECTION, SOLUTION INTRASPINAL AS NEEDED
Status: DISCONTINUED | OUTPATIENT
Start: 2022-08-24 | End: 2022-08-24

## 2022-08-24 RX ORDER — HYDROMORPHONE HCL/PF 1 MG/ML
0.5 SYRINGE (ML) INJECTION EVERY 4 HOURS PRN
Status: ACTIVE | OUTPATIENT
Start: 2022-08-24 | End: 2022-08-26

## 2022-08-24 RX ORDER — GABAPENTIN 100 MG/1
300 CAPSULE ORAL ONCE
Status: COMPLETED | OUTPATIENT
Start: 2022-08-24 | End: 2022-08-24

## 2022-08-24 RX ORDER — ACETAMINOPHEN 325 MG/1
975 TABLET ORAL EVERY 8 HOURS SCHEDULED
Status: DISCONTINUED | OUTPATIENT
Start: 2022-08-24 | End: 2022-08-26 | Stop reason: HOSPADM

## 2022-08-24 RX ORDER — BISACODYL 10 MG
10 SUPPOSITORY, RECTAL RECTAL DAILY PRN
Status: DISCONTINUED | OUTPATIENT
Start: 2022-08-24 | End: 2022-08-26 | Stop reason: HOSPADM

## 2022-08-24 RX ORDER — PROPOFOL 10 MG/ML
INJECTION, EMULSION INTRAVENOUS AS NEEDED
Status: DISCONTINUED | OUTPATIENT
Start: 2022-08-24 | End: 2022-08-24

## 2022-08-24 RX ORDER — FENTANYL CITRATE 50 UG/ML
INJECTION, SOLUTION INTRAMUSCULAR; INTRAVENOUS AS NEEDED
Status: DISCONTINUED | OUTPATIENT
Start: 2022-08-24 | End: 2022-08-24

## 2022-08-24 RX ORDER — FENTANYL CITRATE/PF 50 MCG/ML
50 SYRINGE (ML) INJECTION
Status: DISCONTINUED | OUTPATIENT
Start: 2022-08-24 | End: 2022-08-24 | Stop reason: HOSPADM

## 2022-08-24 RX ADMIN — HYDROMORPHONE HYDROCHLORIDE 0.5 MG: 1 INJECTION, SOLUTION INTRAMUSCULAR; INTRAVENOUS; SUBCUTANEOUS at 13:43

## 2022-08-24 RX ADMIN — CHLORHEXIDINE GLUCONATE 0.12% ORAL RINSE 15 ML: 1.2 LIQUID ORAL at 06:04

## 2022-08-24 RX ADMIN — LORATADINE 10 MG: 10 TABLET ORAL at 15:14

## 2022-08-24 RX ADMIN — ACETAMINOPHEN 975 MG: 325 TABLET ORAL at 21:29

## 2022-08-24 RX ADMIN — SODIUM CHLORIDE, SODIUM LACTATE, POTASSIUM CHLORIDE, AND CALCIUM CHLORIDE 1000 ML: .6; .31; .03; .02 INJECTION, SOLUTION INTRAVENOUS at 15:18

## 2022-08-24 RX ADMIN — EPHEDRINE SULFATE 10 MG: 50 INJECTION INTRAVENOUS at 08:09

## 2022-08-24 RX ADMIN — DOCUSATE SODIUM 100 MG: 100 CAPSULE, LIQUID FILLED ORAL at 17:22

## 2022-08-24 RX ADMIN — EPHEDRINE SULFATE 10 MG: 50 INJECTION INTRAVENOUS at 08:31

## 2022-08-24 RX ADMIN — ENOXAPARIN SODIUM 40 MG: 40 INJECTION SUBCUTANEOUS at 22:13

## 2022-08-24 RX ADMIN — SODIUM CHLORIDE 1000 ML: 0.9 INJECTION, SOLUTION INTRAVENOUS at 11:30

## 2022-08-24 RX ADMIN — SODIUM CHLORIDE 125 ML/HR: 0.9 INJECTION, SOLUTION INTRAVENOUS at 06:17

## 2022-08-24 RX ADMIN — IRON SUCROSE 300 MG: 20 INJECTION, SOLUTION INTRAVENOUS at 19:29

## 2022-08-24 RX ADMIN — Medication 1 TABLET: at 17:22

## 2022-08-24 RX ADMIN — GABAPENTIN 100 MG: 100 CAPSULE ORAL at 15:14

## 2022-08-24 RX ADMIN — FENTANYL CITRATE 100 MCG: 50 INJECTION, SOLUTION INTRAMUSCULAR; INTRAVENOUS at 07:48

## 2022-08-24 RX ADMIN — CEFAZOLIN SODIUM 2000 MG: 2 SOLUTION INTRAVENOUS at 07:29

## 2022-08-24 RX ADMIN — PRAVASTATIN SODIUM 40 MG: 40 TABLET ORAL at 17:22

## 2022-08-24 RX ADMIN — GABAPENTIN 100 MG: 100 CAPSULE ORAL at 22:17

## 2022-08-24 RX ADMIN — ACETAMINOPHEN 975 MG: 325 TABLET ORAL at 15:15

## 2022-08-24 RX ADMIN — ONDANSETRON 4 MG: 2 INJECTION INTRAMUSCULAR; INTRAVENOUS at 10:45

## 2022-08-24 RX ADMIN — SODIUM CHLORIDE, SODIUM LACTATE, POTASSIUM CHLORIDE, AND CALCIUM CHLORIDE: .6; .31; .03; .02 INJECTION, SOLUTION INTRAVENOUS at 08:45

## 2022-08-24 RX ADMIN — EPHEDRINE SULFATE 10 MG: 50 INJECTION INTRAVENOUS at 08:23

## 2022-08-24 RX ADMIN — GABAPENTIN 300 MG: 100 CAPSULE ORAL at 06:04

## 2022-08-24 RX ADMIN — BUPIVACAINE HYDROCHLORIDE IN DEXTROSE 1.6 ML: 7.5 INJECTION, SOLUTION SUBARACHNOID at 07:52

## 2022-08-24 RX ADMIN — SODIUM CHLORIDE 500 ML: 0.9 INJECTION, SOLUTION INTRAVENOUS at 11:40

## 2022-08-24 RX ADMIN — OXYCODONE HYDROCHLORIDE 10 MG: 10 TABLET ORAL at 19:29

## 2022-08-24 RX ADMIN — OMEGA-3 FATTY ACIDS CAP 1000 MG 2000 MG: 1000 CAP at 15:15

## 2022-08-24 RX ADMIN — HYDROMORPHONE HYDROCHLORIDE 0.5 MG: 1 INJECTION, SOLUTION INTRAMUSCULAR; INTRAVENOUS; SUBCUTANEOUS at 12:18

## 2022-08-24 RX ADMIN — TRANEXAMIC ACID 1000 MG: 100 INJECTION, SOLUTION INTRAVENOUS at 08:13

## 2022-08-24 RX ADMIN — PROPOFOL 70 MCG/KG/MIN: 10 INJECTION, EMULSION INTRAVENOUS at 10:46

## 2022-08-24 RX ADMIN — SENNOSIDES 8.6 MG: 8.6 TABLET, FILM COATED ORAL at 21:29

## 2022-08-24 RX ADMIN — SODIUM CHLORIDE, SODIUM LACTATE, POTASSIUM CHLORIDE, AND CALCIUM CHLORIDE 125 ML/HR: .6; .31; .03; .02 INJECTION, SOLUTION INTRAVENOUS at 17:00

## 2022-08-24 RX ADMIN — EPHEDRINE SULFATE 5 MG: 50 INJECTION INTRAVENOUS at 08:16

## 2022-08-24 RX ADMIN — FOLIC ACID 1 MG: 1 TABLET ORAL at 15:15

## 2022-08-24 RX ADMIN — EPHEDRINE SULFATE 10 MG: 50 INJECTION INTRAVENOUS at 11:07

## 2022-08-24 RX ADMIN — SODIUM CHLORIDE, SODIUM LACTATE, POTASSIUM CHLORIDE, AND CALCIUM CHLORIDE 125 ML/HR: .6; .31; .03; .02 INJECTION, SOLUTION INTRAVENOUS at 21:30

## 2022-08-24 RX ADMIN — ACETAMINOPHEN 325MG 975 MG: 325 TABLET ORAL at 06:04

## 2022-08-24 RX ADMIN — MIDAZOLAM 2 MG: 1 INJECTION INTRAMUSCULAR; INTRAVENOUS at 07:34

## 2022-08-24 RX ADMIN — FENTANYL CITRATE 50 MCG: 50 INJECTION, SOLUTION INTRAMUSCULAR; INTRAVENOUS at 11:55

## 2022-08-24 RX ADMIN — PROPOFOL 100 MCG/KG/MIN: 10 INJECTION, EMULSION INTRAVENOUS at 07:56

## 2022-08-24 RX ADMIN — CEFAZOLIN SODIUM 2000 MG: 2 SOLUTION INTRAVENOUS at 22:12

## 2022-08-24 RX ADMIN — EPHEDRINE SULFATE 5 MG: 50 INJECTION INTRAVENOUS at 10:46

## 2022-08-24 RX ADMIN — SODIUM CHLORIDE, SODIUM LACTATE, POTASSIUM CHLORIDE, AND CALCIUM CHLORIDE 1000 ML: .6; .31; .03; .02 INJECTION, SOLUTION INTRAVENOUS at 17:23

## 2022-08-24 RX ADMIN — OXYCODONE HYDROCHLORIDE AND ACETAMINOPHEN 500 MG: 500 TABLET ORAL at 15:15

## 2022-08-24 RX ADMIN — PROPOFOL 90 MG: 10 INJECTION, EMULSION INTRAVENOUS at 07:56

## 2022-08-24 RX ADMIN — EPHEDRINE SULFATE 5 MG: 50 INJECTION INTRAVENOUS at 08:13

## 2022-08-24 RX ADMIN — FENTANYL CITRATE 50 MCG: 50 INJECTION, SOLUTION INTRAMUSCULAR; INTRAVENOUS at 11:48

## 2022-08-24 RX ADMIN — PROPOFOL 80 MCG/KG/MIN: 10 INJECTION, EMULSION INTRAVENOUS at 09:10

## 2022-08-24 RX ADMIN — EPHEDRINE SULFATE 5 MG: 50 INJECTION INTRAVENOUS at 07:25

## 2022-08-24 RX ADMIN — HYDROMORPHONE HYDROCHLORIDE 0.5 MG: 1 INJECTION, SOLUTION INTRAMUSCULAR; INTRAVENOUS; SUBCUTANEOUS at 12:03

## 2022-08-24 RX ADMIN — CEFAZOLIN SODIUM 2000 MG: 2 SOLUTION INTRAVENOUS at 15:16

## 2022-08-24 NOTE — DISCHARGE INSTRUCTIONS
ANTERIOR TOTAL HIP REPLACEMENT DISCHARGE INSTRUCTIONS    Surgical Dressing: You may remove your dressing 3 days from the date of your surgery then change your dressing daily until drainage stops  You should apply dry gauze and tape or tegaderm over your incision  Do not remove the guaze and skin glue  Do not put any lotions or creams on your incision  If there are any signs of infection such as drainage persisting beyond a few days, unusual looking drainage (yellow, green), increased redness around the incision, or fever/chills let your doctor know  Medications:  Upon discharge you will be given a prescription for an anticoagulant (i e  Ecotrin (Aspirin), Coumadin (Warfarin), Lovenox (Enoxaparin)) and a narcotic pain reliever  Do not take any over the counter NSAIDs (i e  Ibuprofen, Motrin, Advil, Naprosyn, Aleve) while on your anticoagulation medication  Narcotic pain relievers cannot be refilled over the phone  Please be mindful of the number of pills you have left so you can  a prescription for a refill if needed during office hours  If you are on Coumadin (Warfarin) you will need your blood drawn every Monday and Thursday once you are home  Initially your visiting nurse will draw your blood  Later you will go to an outpatient lab  You will receive a phone call the next day and your Coumadin (warfarin) will be dosed based on these results  Take this dosage daily until you hear from us next  Walking:  Use two crutches or a walker for EVERY step  Gradually increase your walking daily  You can progress to a cane as tolerated once advised by your physical therapist       Sleeping Positions: You may not sleep on your stomach  Bathing:  No tub baths  Do not submerge your incision  You may shower  You may sit on a shower chair or stand and shower briefly  Use your crutches/walker to get in and out of the shower        Sexual Relations:  Resume according to your comfort  Swimming:  No swimming or hot tubs until approved by your physician  Swimming will be allowed once your incision is well healed  Driving: You may ride as a passenger now  No driving until your follow-up appointment  To get into the car use the front passenger seat with the seat pushed back as far as possible  Scoot yourself back in the seat  Use your hands to assist your legs into the car  Physical therapy:  When you have finished with home visiting PT, you may begin outpatient PT  Call your surgeons office if you have not already received a prescription  A prescription can be faxed to the outpatient center of your choice  Special considerations: To minimize swelling, stiffness, and decrease pain use cold as needed, but not heat  Ice 20 minutes at a time with a cloth between your skin and the ice  Ice after walking, when you have pain, or after you have completed your exercises  Limit your sitting to 60 minutes at one time  Continue to wear your MIKE stockings at all times for 2 weeks after surgery, except when washing  You can wear them as needed after that  Follow up:  Call [phone] to make an appointment to see your surgeon within 2 weeks of your surgery if you havent done so already  If you have any questions during business hours please call the direct office phone number [phone]  Otherwise please call [phone] for any concerns  For the future:  Prior to any dental work, including routine cleanings, call the office for a prescription for antibiotics to be taken prior to your dental appointment  For any invasive procedures (i e  endoscopy, colonoscopy, etc ) inform the doctor performing the procedure that you have a total knee replacement and will antibiotics just prior to the procedure to protect it

## 2022-08-24 NOTE — LETTER
Patient likely to discharge home today and is in need of home PT/OT  Please review and advise if able to accept  Thank you!     JERARDO Martinez  P: [phone]  F: [phone]

## 2022-08-24 NOTE — OP NOTE
OPERATIVE REPORT  PATIENT NAME: Rhea Berry    :  1960  MRN: [id number]  Pt Location: [facility] OR ROOM 03    SURGERY DATE: 2022    Surgeon(s) and Role:     * Muriel Murillo, DO - Primary     * Teja Torres, PACarolynC - Assisting     * Weston Reynoso, PA-C - Assisting     * Shruthi Hector, ATC - Assisting    Preop Diagnosis:  Arthritis of right hip [M16 11]    Post-Op Diagnosis Codes:     * Arthritis of right hip [M16 11]    Procedure(s) (LRB):  ARTHROPLASTY HIP TOTAL ANTERIOR (Right)    Specimen(s):  * No specimens in log *    Estimated Blood Loss:   Minimal    Drains:  Urethral Catheter Latex 16 Fr  (Active)   Number of days: 0       Anesthesia Type:   Spinal    Operative Indications:  Arthritis of right hip [M16 11]      Operative Findings:  See below    Complications:   None    Procedure and Technique:  Implants: Depuy  [place] acetabular sector II cup size 48mm  Actis high offset stem size 4  Neutral acetabular liner  One 6 5 x 20mm screw  32mm +1 5 Biolox delta ceramic femoral head    INDICATIONS FOR PROCEDURE:  The patients is a 63years old female who presented to the office with right hip arthritis  Conservative treatments were tried and failed  The patient had debilitating pain and decreased quality of life from their end-stage arthritis  Surgery was then recommended  Extensive counseling in regards to the reasons for surgical intervention as well as the risks and benefits of surgery were reviewed  The risks include, but are not limited to infection, extensive blood clots, blood clots, wound healing problems, damage to blood vessels and nerves, dislocation, leg length discrepancy, fracture, the need for further surgery  The patient understood and agreed to by oral and written consent      OPERATIVE PROCEDURE:  The patient was identified as Rhea Berry by Her ID bracelet by the surgical staff in the preoperative area at [address]   The patient was wheeled back to the surgical room and placed on the operative table  Preoperative antibiotics were given  Anesthesia was administered  The patient was placed in the supine position on the hana table and all bony prominences were carefully protected  The right leg was then prepped and draped in the usual sterile fashion  A timeout was performed where the patients name and surgical site were once again identified  An incision was made over  the anterior aspect of the patients right hip  Dissection was made through the skin and subcutaneous tissue down to the fascia over the tensor fascia stefany  The fascia was incised and the interval between the tensor fascia stefany and the sartorius was identified  Retractors were placed  Bleeders were cauterized  We dissected down to the capsule  A capsulotomy was made and the capsule was tagged with suture  We released the capsule from the neck  The femoral neck cut was made and the femoral head was removed  Retractors were placed around the acetabulum  The labrum was removed  Sequential reaming took place and a size 48mm acetabular shell was found to be the best fit  XRs were taken to evaluate the position of the cup  The shell was impacted into place  One 6 5 x 20 mm screw was placed through the cup and the final liner was impacted into placed  The liner was checked and found to be secure  Attention was then paid to the femur  The leg was manipulated for exposure of the femur proximally  Soft tissue dissection was done to reveal the greater trochanter  A canal finder were used to open the femoral canal and excess bone was removed laterally  Sequential broaching took place and it was found that a size 3 femoral broach to be the best fit  The broach was left in place and a size 32mm +1 5 femoral head with a high offset neck were then trialed  XRs were taken  The leg lengths were checked and the leg was taken through a ROM to evaluate for stability  Both the stability and leg lengths were found to be acceptable  We noted the right femur to be slightly anteverted more than the left side  We then went back and rebroached to attempt to correct this  Her anatomy would only allow for a very small correction  We were able to broach to a size 4 stem  We retrialed with a high offset neck and 32mm +1 5 femoral head  XRs were taken  The leg lengths were checked and the leg was taken through a ROM to evaluate for stability  Both the stability and leg lengths were found to be acceptable  The trials were then removed  The final femoral stem was impacted into place  The final 32mm +1 5 femoral head was impacted securely into place  The acetabulum was irrigated and the hip was carefully reduced  Copious amounts of irrigation was used to irrigate the hip  An irrisept wash followed by more irrigation was performed  The capsule was repaired with a #5 Ethibond suture  Irrigation was used throughout the closure  The tensor fascia was repaired with #1-0 running vicryl suture  The subcutaneous fat was closed with a running #1-0 vicryl suture  The skin was closed with #2-0 vicryl and #3-0 monocryl subcutaneously  A prineo dressing was placed on the incision and a mepilex dressing was placed over top  The patient was transferred onto a hospital bed and awakened without difficulty  I attest that I was present and performed this procedure  Hamida Mcnair PA-C, Carmelina Sue PA-C, and ABI Miller were present for the entire procedure and provided essential assistance with limb position, patient prepping, and retraction    A qualified resident physician was not available    Patient Disposition:  hemodynamically stable      SIGNATURE: Yvon Carson DO  DATE: August 24, 2022  TIME: 11:03 AM

## 2022-08-24 NOTE — PLAN OF CARE
Problem: MOBILITY - ADULT  Goal: Maintain or return to baseline ADL function  Description: INTERVENTIONS:  -  Assess patient's ability to carry out ADLs; assess patient's baseline for ADL function and identify physical deficits which impact ability to perform ADLs (bathing, care of mouth/teeth, toileting, grooming, dressing, etc )  - Assess/evaluate cause of self-care deficits   - Assess range of motion  - Assess patient's mobility; develop plan if impaired  - Assess patient's need for assistive devices and provide as appropriate  - Encourage maximum independence but intervene and supervise when necessary  - Involve family in performance of ADLs  - Assess for home care needs following discharge   - Consider OT consult to assist with ADL evaluation and planning for discharge  - Provide patient education as appropriate  Outcome: Progressing  Goal: Maintains/Returns to pre admission functional level  Description: INTERVENTIONS:  - Perform BMAT or MOVE assessment daily    - Set and communicate daily mobility goal to care team and patient/family/caregiver  - Collaborate with rehabilitation services on mobility goals if consulted  - Perform Range of Motiontimes a day  - Reposition patient everyhours    - Dangle patient times a day  - Stand patient  times a day  - Ambulate patient times a day  - Out of bed to chairtimes a day   - Out of bed for meals times a day  - Out of bed for toileting  - Record patient progress and toleration of activity level   Outcome: Progressing     Problem: PAIN - ADULT  Goal: Verbalizes/displays adequate comfort level or baseline comfort level  Description: Interventions:  - Encourage patient to monitor pain and request assistance  - Assess pain using appropriate pain scale  - Administer analgesics based on type and severity of pain and evaluate response  - Implement non-pharmacological measures as appropriate and evaluate response  - Consider cultural and social influences on pain and pain management  - Notify physician/advanced practitioner if interventions unsuccessful or patient reports new pain  Outcome: Progressing     Problem: INFECTION - ADULT  Goal: Absence or prevention of progression during hospitalization  Description: INTERVENTIONS:  - Assess and monitor for signs and symptoms of infection  - Monitor lab/diagnostic results  - Monitor all insertion sites, i e  indwelling lines, tubes, and drains  - Monitor endotracheal if appropriate and nasal secretions for changes in amount and color  - Alfred appropriate cooling/warming therapies per order  - Administer medications as ordered  - Instruct and encourage patient and family to use good hand hygiene technique  - Identify and instruct in appropriate isolation precautions for identified infection/condition  Outcome: Progressing  Goal: Absence of fever/infection during neutropenic period  Description: INTERVENTIONS:  - Monitor WBC    Outcome: Progressing     Problem: SAFETY ADULT  Goal: Maintain or return to baseline ADL function  Description: INTERVENTIONS:  -  Assess patient's ability to carry out ADLs; assess patient's baseline for ADL function and identify physical deficits which impact ability to perform ADLs (bathing, care of mouth/teeth, toileting, grooming, dressing, etc )  - Assess/evaluate cause of self-care deficits   - Assess range of motion  - Assess patient's mobility; develop plan if impaired  - Assess patient's need for assistive devices and provide as appropriate  - Encourage maximum independence but intervene and supervise when necessary  - Involve family in performance of ADLs  - Assess for home care needs following discharge   - Consider OT consult to assist with ADL evaluation and planning for discharge  - Provide patient education as appropriate  Outcome: Progressing  Goal: Maintains/Returns to pre admission functional level  Description: INTERVENTIONS:  - Perform BMAT or MOVE assessment daily    - Set and communicate daily mobility goal to care team and patient/family/caregiver  - Collaborate with rehabilitation services on mobility goals if consulted  - Perform Range of Motion times a day  - Reposition patient every hours    - Dangle patient times a day  - Stand patient imes a day  - Ambulate patient times a day  - Out of bed to chair  times a day   - Out of bed for meals times a day  - Out of bed for toileting  - Record patient progress and toleration of activity level   Outcome: Progressing  Goal: Patient will remain free of falls  Description: INTERVENTIONS:  - Educate patient/family on patient safety including physical limitations  - Instruct patient to call for assistance with activity   - Consult OT/PT to assist with strengthening/mobility   - Keep Call bell within reach  - Keep bed low and locked with side rails adjusted as appropriate  - Keep care items and personal belongings within reach  - Initiate and maintain comfort rounds  - Make Fall Risk Sign visible to staff  - Offer Toileting every Hours, in advance of need  - Initiate/Maintainalarm  - Obtain necessary fall risk management equipment:  - Apply yellow socks and bracelet for high fall risk patients  - Consider moving patient to room near nurses station  Outcome: Progressing     Problem: DISCHARGE PLANNING  Goal: Discharge to home or other facility with appropriate resources  Description: INTERVENTIONS:  - Identify barriers to discharge w/patient and caregiver  - Arrange for needed discharge resources and transportation as appropriate  - Identify discharge learning needs (meds, wound care, etc )  - Arrange for interpretive services to assist at discharge as needed  - Refer to Case Management Department for coordinating discharge planning if the patient needs post-hospital services based on physician/advanced practitioner order or complex needs related to functional status, cognitive ability, or social support system  Outcome: Progressing     Problem: Knowledge Deficit  Goal: Patient/family/caregiver demonstrates understanding of disease process, treatment plan, medications, and discharge instructions  Description: Complete learning assessment and assess knowledge base    Interventions:  - Provide teaching at level of understanding  - Provide teaching via preferred learning methods  Outcome: Progressing

## 2022-08-24 NOTE — PHYSICAL THERAPY NOTE
PHYSICAL THERAPY NOTE          Patient Name: Vanita Desouza  Today's Date: 8/24/2022 08/24/22 1520   PT Last Visit   PT Visit Date 08/24/22   Note Type   Note type Cancelled Session   Cancel Reasons Medical status   Additional Comments PT consult received  Attempted PT eval however pt currently hypotensive w/ BP of  90/54 supine & receiving bolus as well as w/ severe pain hence unable to participate in PT eval  Will defer PT eval at this time  Will follow in a m  RN aware      Charlie Hutchins, PT

## 2022-08-24 NOTE — ANESTHESIA PREPROCEDURE EVALUATION
Procedure:  ARTHROPLASTY HIP TOTAL ANTERIOR (Right Hip)    Relevant Problems   CARDIO   (+) HTN (hypertension)   (+) Hyperlipidemia        Physical Exam    Airway    Mallampati score: II  TM Distance: >3 FB  Neck ROM: full     Dental   No notable dental hx     Cardiovascular  Rhythm: regular, Rate: normal, Cardiovascular exam normal    Pulmonary  Pulmonary exam normal Breath sounds clear to auscultation,     Other Findings        Anesthesia Plan  ASA Score- 2     Anesthesia Type- spinal with ASA Monitors  Additional Monitors:   Airway Plan:           Plan Factors-    Chart reviewed  EKG reviewed  Existing labs reviewed  Patient summary reviewed  Induction-     Postoperative Plan- Plan for postoperative opioid use  Informed Consent- Anesthetic plan and risks discussed with patient

## 2022-08-24 NOTE — OR NURSING
Attempted to call pt's significant other and give him an update on the procedure, but there was no answer

## 2022-08-24 NOTE — INTERVAL H&P NOTE
H&P reviewed  After examining the patient I find no changes in the patients condition since the H&P had been written      Vitals:    08/24/22 0541   BP: 132/76   Pulse: 75   Resp: 16   Temp: 98 °F (36 7 °C)   SpO2: 97%

## 2022-08-24 NOTE — ANESTHESIA POSTPROCEDURE EVALUATION
Post-Op Assessment Note    CV Status:  Stable    Pain management: adequate     Mental Status:  Alert and awake   Hydration Status:  Euvolemic   PONV Controlled:  Controlled   Airway Patency:  Patent      Post Op Vitals Reviewed: Yes      Staff: Anesthesiologist         No complications documented      BP      Temp      Pulse     Resp      SpO2      /61   Pulse 65   Temp 97 5 °F (36 4 °C)   Resp 15   Ht 5' 5 5" (1 664 m)   Wt 87 2 kg (192 lb 3 9 oz)   SpO2 100%   BMI 31 50 kg/m²

## 2022-08-24 NOTE — ANESTHESIA PROCEDURE NOTES
Spinal Block    Patient location during procedure: OR  Start time: 8/24/2022 7:52 AM  Reason for block: primary anesthetic  Staffing  Performed: CRNA   Anesthesiologist: Kareem Cherry MD  Resident/CRNA: Leticia Renee CRNA  Preanesthetic Checklist  Completed: patient identified, IV checked, site marked, risks and benefits discussed, surgical consent, monitors and equipment checked, pre-op evaluation and timeout performed  Spinal Block  Patient position: sitting  Prep: ChloraPrep  Patient monitoring: heart rate, continuous pulse ox and frequent blood pressure checks  Approach: midline  Location: L3-4  Injection technique: single-shot  Needle  Needle type: pencil-tip   Needle gauge: 25 G  Needle length: 10 cm  Assessment  Sensory level: T4  Events: cerebrospinal fluid  Injection Assessment:  positive aspiration for clear CSF, no paresthesia on injection and negative aspiration for heme    Post-procedure:  site cleaned

## 2022-08-25 PROBLEM — E78.5 HYPERLIPIDEMIA: Status: ACTIVE | Noted: 2022-08-24

## 2022-08-25 LAB
ANION GAP SERPL CALCULATED.3IONS-SCNC: 9 MMOL/L (ref 4–13)
BUN SERPL-MCNC: 13 MG/DL (ref 5–25)
CALCIUM SERPL-MCNC: 8.4 MG/DL (ref 8.3–10.1)
CHLORIDE SERPL-SCNC: 107 MMOL/L (ref 96–108)
CO2 SERPL-SCNC: 28 MMOL/L (ref 21–32)
CREAT SERPL-MCNC: 1.1 MG/DL (ref 0.6–1.3)
ERYTHROCYTE [DISTWIDTH] IN BLOOD BY AUTOMATED COUNT: 14.4 % (ref 11.6–15.1)
GFR SERPL CREATININE-BSD FRML MDRD: 54 ML/MIN/1.73SQ M
GLUCOSE SERPL-MCNC: 117 MG/DL (ref 65–140)
HCT VFR BLD AUTO: 31.4 % (ref 34.8–46.1)
HGB BLD-MCNC: 10 G/DL (ref 11.5–15.4)
MCH RBC QN AUTO: 34.2 PG (ref 26.8–34.3)
MCHC RBC AUTO-ENTMCNC: 31.8 G/DL (ref 31.4–37.4)
MCV RBC AUTO: 108 FL (ref 82–98)
PLATELET # BLD AUTO: 209 THOUSANDS/UL (ref 149–390)
PMV BLD AUTO: 10.7 FL (ref 8.9–12.7)
POTASSIUM SERPL-SCNC: 4.7 MMOL/L (ref 3.5–5.3)
RBC # BLD AUTO: 2.92 MILLION/UL (ref 3.81–5.12)
SODIUM SERPL-SCNC: 144 MMOL/L (ref 135–147)
WBC # BLD AUTO: 4.73 THOUSAND/UL (ref 4.31–10.16)

## 2022-08-25 PROCEDURE — 97110 THERAPEUTIC EXERCISES: CPT

## 2022-08-25 PROCEDURE — 97163 PT EVAL HIGH COMPLEX 45 MIN: CPT

## 2022-08-25 PROCEDURE — 99243 OFF/OP CNSLTJ NEW/EST LOW 30: CPT | Performed by: INTERNAL MEDICINE

## 2022-08-25 PROCEDURE — 97535 SELF CARE MNGMENT TRAINING: CPT

## 2022-08-25 PROCEDURE — 85027 COMPLETE CBC AUTOMATED: CPT | Performed by: PHYSICIAN ASSISTANT

## 2022-08-25 PROCEDURE — 99024 POSTOP FOLLOW-UP VISIT: CPT | Performed by: PHYSICIAN ASSISTANT

## 2022-08-25 PROCEDURE — 80048 BASIC METABOLIC PNL TOTAL CA: CPT | Performed by: PHYSICIAN ASSISTANT

## 2022-08-25 PROCEDURE — 97116 GAIT TRAINING THERAPY: CPT

## 2022-08-25 PROCEDURE — 97530 THERAPEUTIC ACTIVITIES: CPT

## 2022-08-25 PROCEDURE — 97167 OT EVAL HIGH COMPLEX 60 MIN: CPT

## 2022-08-25 PROCEDURE — 97112 NEUROMUSCULAR REEDUCATION: CPT

## 2022-08-25 RX ORDER — POLYETHYLENE GLYCOL 3350 17 G/17G
17 POWDER, FOR SOLUTION ORAL DAILY PRN
Status: DISCONTINUED | OUTPATIENT
Start: 2022-08-25 | End: 2022-08-26 | Stop reason: HOSPADM

## 2022-08-25 RX ADMIN — OXYCODONE HYDROCHLORIDE 10 MG: 10 TABLET ORAL at 01:09

## 2022-08-25 RX ADMIN — GABAPENTIN 100 MG: 100 CAPSULE ORAL at 21:45

## 2022-08-25 RX ADMIN — DOCUSATE SODIUM 100 MG: 100 CAPSULE, LIQUID FILLED ORAL at 11:35

## 2022-08-25 RX ADMIN — DOCUSATE SODIUM 100 MG: 100 CAPSULE, LIQUID FILLED ORAL at 17:12

## 2022-08-25 RX ADMIN — OXYCODONE HYDROCHLORIDE 10 MG: 10 TABLET ORAL at 07:28

## 2022-08-25 RX ADMIN — OMEGA-3 FATTY ACIDS CAP 1000 MG 2000 MG: 1000 CAP at 11:35

## 2022-08-25 RX ADMIN — OXYCODONE HYDROCHLORIDE 10 MG: 10 TABLET ORAL at 17:12

## 2022-08-25 RX ADMIN — LORATADINE 10 MG: 10 TABLET ORAL at 11:35

## 2022-08-25 RX ADMIN — Medication 1 TABLET: at 07:28

## 2022-08-25 RX ADMIN — Medication 1 TABLET: at 17:12

## 2022-08-25 RX ADMIN — SENNOSIDES 8.6 MG: 8.6 TABLET, FILM COATED ORAL at 21:32

## 2022-08-25 RX ADMIN — ACETAMINOPHEN 975 MG: 325 TABLET ORAL at 15:04

## 2022-08-25 RX ADMIN — FOLIC ACID 1 MG: 1 TABLET ORAL at 11:35

## 2022-08-25 RX ADMIN — PRAVASTATIN SODIUM 40 MG: 40 TABLET ORAL at 17:12

## 2022-08-25 RX ADMIN — ACETAMINOPHEN 975 MG: 325 TABLET ORAL at 05:40

## 2022-08-25 RX ADMIN — GABAPENTIN 100 MG: 100 CAPSULE ORAL at 15:06

## 2022-08-25 RX ADMIN — OXYCODONE HYDROCHLORIDE AND ACETAMINOPHEN 500 MG: 500 TABLET ORAL at 11:35

## 2022-08-25 RX ADMIN — SODIUM CHLORIDE, SODIUM LACTATE, POTASSIUM CHLORIDE, AND CALCIUM CHLORIDE 125 ML/HR: .6; .31; .03; .02 INJECTION, SOLUTION INTRAVENOUS at 05:40

## 2022-08-25 RX ADMIN — GABAPENTIN 100 MG: 100 CAPSULE ORAL at 06:35

## 2022-08-25 RX ADMIN — ACETAMINOPHEN 975 MG: 325 TABLET ORAL at 21:32

## 2022-08-25 RX ADMIN — OXYCODONE HYDROCHLORIDE 10 MG: 10 TABLET ORAL at 11:34

## 2022-08-25 RX ADMIN — OXYCODONE HYDROCHLORIDE 10 MG: 10 TABLET ORAL at 22:20

## 2022-08-25 NOTE — PLAN OF CARE
Problem: PHYSICAL THERAPY ADULT  Goal: Performs mobility at highest level of function for planned discharge setting  See evaluation for individualized goals  Description: Treatment/Interventions: Functional transfer training, LE strengthening/ROM, Elevations, Therapeutic exercise, Endurance training, Patient/family training, Bed mobility, Gait training, Spoke to nursing, OT  Equipment Recommended: Ramon Heaton (pt has RW)       See flowsheet documentation for full assessment, interventions and recommendations  Note: Prognosis: Good  Problem List: Decreased strength, Decreased range of motion, Decreased endurance, Decreased mobility, Impaired balance, Pain  Assessment: Pt  63 y  o female  Status post total hip replacement, right 2* to Arthritis of right hip (M16 11)  Pt referred to PT for mobility assessment & D/C planning  WBAT RLE  (+) anterior hip precautions  Please see above for information re: home set-up & PLOF as well as objective findings during PT assessment  PTA, pt reports being I w/o AD  On eval, pt functioning below baseline hence will continue skilled PT to improve function & safety  Pt require minAx1 for most functional mobility + cues for techniques & safety  Gait slow & antalgic w/ dec foot clearance & difficulty advancing RLE 2* to pain & weakness  No gross LOB noted  Pt tolerated further mobility training after IE, please additional tx below for details  The patient's AM-PAC Basic Mobility Inpatient Short Form Raw Score is 17  A Raw score of greater than 16 suggests the patient may benefit from discharge to home  Please also refer to the recommendation of the Physical Therapist for safe discharge planning  From PT standpoint, will anticipate good progress in PT for safe D/C to home w/ inc family support  Will recommend HHPT or OPPT at D/C, pending progress   No SOB but (+) nausea & dizziness reported after amb trials but relieved w/ seated rest  BP as follows: 123/76 EOB; 115/90 after 1st amb trial; 104/68 after 2nd amb trial; 113/70 at end of session w/ BLE elevated & SCDs  Nsg staff most recent vital signs as follows: /58 (BP Location: Right arm)   Pulse 70   Temp 98 2 °F (36 8 °C) (Temporal)   Resp 19   Ht 5' 6" (1 676 m)   Wt 94 kg (207 lb 3 7 oz)   SpO2 100%   BMI 33 45 kg/m²   At end of session, pt OOB in chair in stable condition, call bell & phone in reach, chair alarm activated, all lines intact  Fall precautions reinforced w/ good understanding  CM to follow  Nsg staff to continue to mobilized pt (OOB in chair for all meals & ambulate in room/unit) as tolerated to prevent further decline in function  Nsg notified  Co-eval was necessary to complete this PT eval for the pt's best interest given pt's medical acuity & complexity  PT Discharge Recommendation: Home with home health rehabilitation (HHPT or OPPT pending progress)    See flowsheet documentation for full assessment

## 2022-08-25 NOTE — PLAN OF CARE
Problem: PHYSICAL THERAPY ADULT  Goal: Performs mobility at highest level of function for planned discharge setting  See evaluation for individualized goals  Description: Treatment/Interventions: Functional transfer training, LE strengthening/ROM, Elevations, Therapeutic exercise, Endurance training, Patient/family training, Bed mobility, Gait training, Spoke to nursing, OT  Equipment Recommended: Marya Anne (pt has RW)       See flowsheet documentation for full assessment, interventions and recommendations  Outcome: Progressing  Note: Prognosis: Good  Problem List: Decreased strength, Decreased range of motion, Decreased endurance, Impaired balance, Decreased mobility, Pain, Orthopedic restrictions  Assessment: Pt seen for PT treatment session this date with interventions consisting of bed mobility tasks, transfer training, gait training, neuromuscular re-education of movement to improve dynamic standing balance, seated TE, toilet tranfers, and education provided as needed for safety and direction to improve functional mobility and activity tolerance  Pt agreeable to PT treatment session upon arrival, pt found resting in bed   At end of session, pt left in bed positioned for comfort with SCD's applied and all needs in reach  In comparison to previous session, pt with improvements in ambulation distance   Continue to recommend Home PT  or  OOPT pending progress at time of d/c in order to maximize pt's functional independence and safety w/ mobility  Pt continues to be functioning below baseline level  PT will continue to see pt while here in order to address the deficits listed above and provide interventions consistent w/ POC in effort to achieve STGs  PT Discharge Recommendation: Home with home health rehabilitation (or OOPT pending progress)    See flowsheet documentation for full assessment

## 2022-08-25 NOTE — PLAN OF CARE
Problem: MOBILITY - ADULT  Goal: Maintain or return to baseline ADL function  Description: INTERVENTIONS:  -  Assess patient's ability to carry out ADLs; assess patient's baseline for ADL function and identify physical deficits which impact ability to perform ADLs (bathing, care of mouth/teeth, toileting, grooming, dressing, etc )  - Assess/evaluate cause of self-care deficits   - Assess range of motion  - Assess patient's mobility; develop plan if impaired  - Assess patient's need for assistive devices and provide as appropriate  - Encourage maximum independence but intervene and supervise when necessary  - Involve family in performance of ADLs  - Assess for home care needs following discharge   - Consider OT consult to assist with ADL evaluation and planning for discharge  - Provide patient education as appropriate  Outcome: Progressing  Goal: Maintains/Returns to pre admission functional level  Description: INTERVENTIONS:  - Perform BMAT or MOVE assessment daily    - Set and communicate daily mobility goal to care team and patient/family/caregiver  - Collaborate with rehabilitation services on mobility goals if consulted  - Perform Range of Motion 3 times a day  - Reposition patient every 2 hours    - Dangle patient 3 times a day  - Stand patient 3 times a day  - Ambulate patient 3 times a day  - Out of bed to chair 3 times a day   - Out of bed for meals 3 times a day  - Out of bed for toileting  - Record patient progress and toleration of activity level   Outcome: Progressing     Problem: PAIN - ADULT  Goal: Verbalizes/displays adequate comfort level or baseline comfort level  Description: Interventions:  - Encourage patient to monitor pain and request assistance  - Assess pain using appropriate pain scale  - Administer analgesics based on type and severity of pain and evaluate response  - Implement non-pharmacological measures as appropriate and evaluate response  - Consider cultural and social influences on pain and pain management  - Notify physician/advanced practitioner if interventions unsuccessful or patient reports new pain  Outcome: Progressing     Problem: INFECTION - ADULT  Goal: Absence or prevention of progression during hospitalization  Description: INTERVENTIONS:  - Assess and monitor for signs and symptoms of infection  - Monitor lab/diagnostic results  - Monitor all insertion sites, i e  indwelling lines, tubes, and drains  - Monitor endotracheal if appropriate and nasal secretions for changes in amount and color  - [place] appropriate cooling/warming therapies per order  - Administer medications as ordered  - Instruct and encourage patient and family to use good hand hygiene technique  - Identify and instruct in appropriate isolation precautions for identified infection/condition  Outcome: Progressing  Goal: Absence of fever/infection during neutropenic period  Description: INTERVENTIONS:  - Monitor WBC    Outcome: Progressing     Problem: SAFETY ADULT  Goal: Maintain or return to baseline ADL function  Description: INTERVENTIONS:  -  Assess patient's ability to carry out ADLs; assess patient's baseline for ADL function and identify physical deficits which impact ability to perform ADLs (bathing, care of mouth/teeth, toileting, grooming, dressing, etc )  - Assess/evaluate cause of self-care deficits   - Assess range of motion  - Assess patient's mobility; develop plan if impaired  - Assess patient's need for assistive devices and provide as appropriate  - Encourage maximum independence but intervene and supervise when necessary  - Involve family in performance of ADLs  - Assess for home care needs following discharge   - Consider OT consult to assist with ADL evaluation and planning for discharge  - Provide patient education as appropriate  Outcome: Progressing  Goal: Maintains/Returns to pre admission functional level  Description: INTERVENTIONS:  - Perform BMAT or MOVE assessment daily    - Set and communicate daily mobility goal to care team and patient/family/caregiver  - Collaborate with rehabilitation services on mobility goals if consulted  - Perform Range of Motion 3 times a day  - Reposition patient every 2 hours    - Dangle patient 3 times a day  - Stand patient 3 times a day  - Ambulate patient 3 times a day  - Out of bed to chair 3 times a day   - Out of bed for meals 3 times a day  - Out of bed for toileting  - Record patient progress and toleration of activity level   Outcome: Progressing  Goal: Patient will remain free of falls  Description: INTERVENTIONS:  - Educate patient/family on patient safety including physical limitations  - Instruct patient to call for assistance with activity   - Consult OT/PT to assist with strengthening/mobility   - Keep Call bell within reach  - Keep bed low and locked with side rails adjusted as appropriate  - Keep care items and personal belongings within reach  - Initiate and maintain comfort rounds  - Make Fall Risk Sign visible to staff  - Offer Toileting every 2 Hours, in advance of need  - Initiate/Maintain bed/chair alarm  - Obtain necessary fall risk management equipment: walker  - Apply yellow socks and bracelet for high fall risk patients  - Consider moving patient to room near nurses station  Outcome: Progressing     Problem: DISCHARGE PLANNING  Goal: Discharge to home or other facility with appropriate resources  Description: INTERVENTIONS:  - Identify barriers to discharge w/patient and caregiver  - Arrange for needed discharge resources and transportation as appropriate  - Identify discharge learning needs (meds, wound care, etc )  - Arrange for interpretive services to assist at discharge as needed  - Refer to Case Management Department for coordinating discharge planning if the patient needs post-hospital services based on physician/advanced practitioner order or complex needs related to functional status, cognitive ability, or social support system  Outcome: Progressing     Problem: Knowledge Deficit  Goal: Patient/family/caregiver demonstrates understanding of disease process, treatment plan, medications, and discharge instructions  Description: Complete learning assessment and assess knowledge base    Interventions:  - Provide teaching at level of understanding  - Provide teaching via preferred learning methods  Outcome: Progressing     Problem: Potential for Falls  Goal: Patient will remain free of falls  Description: INTERVENTIONS:  - Educate patient/family on patient safety including physical limitations  - Instruct patient to call for assistance with activity   - Consult OT/PT to assist with strengthening/mobility   - Keep Call bell within reach  - Keep bed low and locked with side rails adjusted as appropriate  - Keep care items and personal belongings within reach  - Initiate and maintain comfort rounds  - Make Fall Risk Sign visible to staff  - Offer Toileting every 2 Hours, in advance of need  - Initiate/Maintain bed/chair alarm  - Obtain necessary fall risk management equipment: walker  - Apply yellow socks and bracelet for high fall risk patients  - Consider moving patient to room near nurses station  Outcome: Progressing

## 2022-08-25 NOTE — PHYSICAL THERAPY NOTE
PT EVALUATION & TREAMENT    Pt  Name: Loan Brooke  Pt  Age: 63 y o  MRN: [id number]  LENGTH OF STAY: 0      Admitting Diagnoses:   Arthritis of right hip [M16 11]    Past Medical History:   Diagnosis Date    HTN (hypertension) 8/24/2022    Hyperlipidemia 8/24/2022    Spontaneous miscarriage        Past Surgical History:   Procedure Laterality Date    COLONOSCOPY      FOOT SURGERY      CT TOTAL HIP ARTHROPLASTY Right 8/24/2022    Procedure: ARTHROPLASTY HIP TOTAL ANTERIOR;  Surgeon: Geno Murillo DO;  Location: [facility];  Service: Orthopedics    TOTAL ABDOMINAL HYSTERECTOMY  11/2007    with removal of both ovaries       Imaging Studies:  XR hip/pelv 1 vw right if performed   Final Result by Brenda Tijerina MD (08/24 1253)      Intact appearing newly placed total right hip arthroplasty, unchanged            Workstation performed: [id number]         XR hip/pelv 1 vw left if performed   Final Result by Elliott Hebert MD (08/24 1251)      Left hip replacement  Workstation performed: [id number]         XR hip/pelv 2-3 vws right if performed    (Results Pending)         08/25/22 0928   PT Last Visit   PT Visit Date 08/25/22   Note Type   Note type Evaluation  (& treatment)   Pain Assessment   Pain Score 7   Pain Location/Orientation Orientation: Right;Location: Hip   Hospital Pain Intervention(s) Medication (See MAR); Cold applied;Repositioned; Ambulation/increased activity; Emotional support; Rest   Restrictions/Precautions   Weight Bearing Precautions Per Order Yes   RLE Weight Bearing Per Order WBAT   Other Precautions Fall Risk;Pain;THR  (anterior hip precautions)   Home Living   Type of [address] [place] [address] One level; Laundry in basement;Stairs to enter with rails  (4STE)   Bathroom Shower/Tub Tub/shower unit   [employer] Raised   [employer] Grab bars in shower; Shower chair;Commode;Grab bars around toilet   Home Equipment Walker;Cane  (does not use at baseline)   Prior Function   Level of [place] Independent with ADLs and functional mobility  (w/o AD)   Lives With Family  (son & granddaughter; there is also a significant other)   Receives Help From   [address] in the last 6 months 1 to 4  (1x)   Comments (+) ; (+) times home alone-> son works during the day & granddaughter going back to school   General   Family/Caregiver Present No   Cognition   Overall Cognitive Status WFL   Arousal/Participation Alert   Orientation Level Oriented X4   Following Commands Follows all commands and directions without difficulty   Comments pleasant & cooperative   Subjective   Subjective Pt agreeable to PT/OT evals  RUE Assessment   RUE Assessment   (refer to OT)   LUE Assessment   LUE Assessment   (refer to OT)   RLE Assessment   RLE Assessment X  (3-/5 grossly except hip 2/5)   LLE Assessment   LLE Assessment WFL  (4/5 grossly)   Coordination   Movements are Fluid and Coordinated 1   Sensation WFL   Bed Mobility   Supine to Sit 4  Minimal assistance   Additional items Assist x 1;HOB elevated; Bedrails; Increased time required;Verbal cues;LE management   Additional Comments cues for techniques & safety   Transfers   Sit to Stand 4  Minimal assistance   Additional items Assist x 1; Increased time required;Verbal cues   Stand to Sit 4  Minimal assistance   Additional items Assist x 1; Increased time required;Verbal cues   Toilet transfer 4  Minimal assistance   Additional items Assist x 1; Increased time required;Standard toilet; Other  (grab bar)   Additional Comments cues for techniques & safety especially hand placement & RLE positioning   Ambulation/Elevation   Gait pattern Antalgic; Wide ELIZABETH; Decreased foot clearance;Decreased R stance;Shuffling; Short stride; Step to;Excessively slow;Decreased heel strike   Gait Assistance 4  Minimal assist   Additional items Assist x 1;Verbal cues; Tactile cues   Assistive Device Rolling walker   Distance 15'x1   Ambulation/Elevation Additional Comments slow & unsteay but no gross LOB noted; difficulty advancing RLE 2* to pain & weakness   Balance   Static Sitting Fair +   Dynamic Sitting Fair   Static Standing Fair -   Dynamic Standing Poor +   Ambulatory Poor +   Endurance Deficit   Endurance Deficit Yes   Endurance Deficit Description pain, nausea & dizziness   Activity Tolerance   Activity Tolerance Patient limited by fatigue;Patient limited by pain;Treatment limited secondary to medical complications (Comment)   Medical Staff Made Aware OTR Graeme   Nurse Made Aware RN Omer Kin   Assessment   Prognosis Good   Problem List Decreased strength;Decreased range of motion;Decreased endurance;Decreased mobility; Impaired balance;Pain   Assessment Pt  63 y  o female  Status post total hip replacement, right 2* to Arthritis of right hip (M16 11)  Pt referred to PT for mobility assessment & D/C planning  WBAT RLE  (+) anterior hip precautions  Please see above for information re: home set-up & PLOF as well as objective findings during PT assessment  PTA, pt reports being I w/o AD  On eval, pt functioning below baseline hence will continue skilled PT to improve function & safety  Pt require minAx1 for most functional mobility + cues for techniques & safety  Gait slow & antalgic w/ dec foot clearance & difficulty advancing RLE 2* to pain & weakness  No gross LOB noted  Pt tolerated further mobility training after IE, please additional tx below for details  The patient's AM-PAC Basic Mobility Inpatient Short Form Raw Score is 17  A Raw score of greater than 16 suggests the patient may benefit from discharge to home  Please also refer to the recommendation of the Physical Therapist for safe discharge planning  From PT standpoint, will anticipate good progress in PT for safe D/C to home w/ inc family support  Will recommend HHPT or OPPT at D/C, pending progress   No SOB but (+) nausea & dizziness reported after amb trials but relieved w/ seated rest  BP as follows: 123/76 EOB; 115/90 after 1st amb trial; 104/68 after 2nd amb trial; 113/70 at end of session w/ BLE elevated & SCDs  Nsg staff most recent vital signs as follows: /58 (BP Location: Right arm)   Pulse 70   Temp 98 2 °F (36 8 °C) (Temporal)   Resp 19   Ht 5' 6" (1 676 m)   Wt 94 kg (207 lb 3 7 oz)   SpO2 100%   BMI 33 45 kg/m²   At end of session, pt OOB in chair in stable condition, call bell & phone in reach, chair alarm activated, all lines intact  Fall precautions reinforced w/ good understanding  CM to follow  Nsg staff to continue to mobilized pt (OOB in chair for all meals & ambulate in room/unit) as tolerated to prevent further decline in function  Nsg notified  Co-eval was necessary to complete this PT eval for the pt's best interest given pt's medical acuity & complexity  Goals   Patient Goals to feel better   [facility] Expiration Date 09/01/22   Short Term Goal #1 Goals to be met in 7 days; pt will be able to: 1) inc strength & balance by 1/2 grade to improve overall functional mobility & dec fall risk; 2) inc bed mobility to modified I for pt to be able to get in/OOB safely w/ proper techniques 100% of the time, to dec caregiver burden & safely function at home; 3) inc transfers to modified I for pt to transition safely from one surface to another w/o % of the time, to dec caregiver burden & safely function at home; 4) inc amb w/ RW approx  >80' w/ S for pt to ambulate household distances w/o any % of the time, to dec caregiver burden & safely function at home; 5) negotiate stairs w/ S for inc safety during stair mgt inside/outside of home & dec caregiver burden; 6) pt/caregiver ed   PT Treatment Day 1   Plan   Treatment/Interventions Functional transfer training;LE strengthening/ROM; Elevations; Therapeutic exercise; Endurance training;Patient/family training;Bed mobility;Gait training;Spoke to nursing;OT   PT Frequency Twice a day Recommendation   PT Discharge Recommendation Home with home health rehabilitation  (HHPT or OPPT pending progress)   Equipment Recommended Walker  (pt has RW)   AM-PAC Basic Mobility Inpatient   Turning in Bed Without Bedrails 3   Lying on Back to Sitting on Edge of Flat Bed 3   Moving Bed to Chair 3   Standing Up From Chair 3   Walk in Room 3   Climb 3-5 Stairs 2   Basic Mobility Inpatient Raw Score 17   Basic Mobility Standardized Score 39 67   Highest Level Of Mobility   -HL Goal 5: Stand one or more mins   -HL Achieved 6: Walk 10 steps or more   Additional Treatment Session   Start Time 0905   End Time 0928   Treatment Assessment After IE, pt tolerated further mobility training  Pt continue to require minAx1 for transfers including toilet transfers + cues for hand placement RLE positioning  Pt able to ambulate another 15'x1 w/ minAx1 & RW  Gait still slow, antalgic & unsteady but no gross LOB noted  Still w/ difficulty advancing RLE but improved compared to 1st amb trial  Inc nausea & dizziness after this amb w/ dropped of BP to 104/68  Symptoms resolving once BLE elevated on foot stool + 2L O2 NC w/ improved BP to 113/70  SCDs reapplied at end of session  Symptoms completely resolved at end of session w/ BLE still elevated on foot stool, maintained on 2L O2 NC & active SCDs  RN aware  Will continue PT per POC  Equipment Use RW   Additional Treatment Day 1   End of Consult   Patient Position at End of Consult Bedside chair;Bed/Chair alarm activated; All needs within reach   End of Consult Comments Pt in stable condition  All needs in reach  All lines intact  Chair alarm activated     Hx/personal factors: co-morbidities, inaccessible home, dec caregiver support, mutliple lines, use of AD, pain, total hip precautions, fall risk, assist w/ ADL's, O2, and hypotension  Examination: dec mobility, dec balance, dec endurance, dec amb, risk for falls, pain  Clinical: unpredictable (ongoing medical status, abnormal lab values, risk for falls, and pain mgt)  Complexity: high    Jaylen Said, PT

## 2022-08-25 NOTE — PROGRESS NOTES
Progress Note - Orthopedics   Nato James 63 y o  female MRN: [id number]  Unit/Bed#: E2 -01 Encounter: [id number]    Assessment:  63 y o  female s/p right anterior total hip arthroplasty POD 1, ABLA, hypotension    Plan:  · ABLA- post op  Receiving venofer  Continue to monitor  · Hypotension- improving  Continue IVF until oral intake improves  · Pain control prn  · PT/OT-WBAT right LE   · Anterior hip precautions  · Lovenox/TEDs/SCDs for DVT prophylaxis  Patient will be discharged with ASA  · Abx x 24h  · Dc planning- home pending improvement in BP, clearance from PT and SLIM  Subjective: Pt S&E  Resting comfortably, sleeping upon entering the room  Patient states she did have pain overnight and was not able to sleep until 3 am  She notes her pain has improved after receiving medications  She was lightheaded yesterday but has not noticed yet today  She has not been out of bed  Patient is passing gas  Denies fevers, chills, abd pain, distal numbness, or tingling  Vitals: Blood pressure 103/58, pulse 70, temperature 98 2 °F (36 8 °C), temperature source Temporal, resp  rate 19, height 5' 6" (1 676 m), weight 94 kg (207 lb 3 7 oz), SpO2 100 %  ,Body mass index is 33 45 kg/m²        Intake/Output Summary (Last 24 hours) at 8/25/2022 0801  Last data filed at 8/25/2022 0601  Gross per 24 hour   Intake 2800 ml   Output 7075 ml   Net -4275 ml       Invasive Devices  Report    Peripheral Intravenous Line  Duration           Peripheral IV 08/24/22 Right Wrist 1 day          Drain  Duration           Urethral Catheter Latex 16 Fr  1 day                Ortho Exam: rightLE:  Drsg:  C/D/I, sensation grossly intact L4, L5, S1, palpable pedal pulse, EHL/AT/GS intact    Lab, Imaging and other studies:   CBC:   Lab Results   Component Value Date    WBC 4 73 08/25/2022    HGB 10 0 (L) 08/25/2022    HCT 31 4 (L) 08/25/2022     (H) 08/25/2022     08/25/2022    MCH 34 2 08/25/2022    MCHC 31 8 08/25/2022    RDW 14 4 08/25/2022    MPV 10 7 08/25/2022     CMP:   Lab Results   Component Value Date    SODIUM 144 08/25/2022     08/25/2022    CO2 28 08/25/2022    BUN 13 08/25/2022    CREATININE 1 10 08/25/2022    CALCIUM 8 4 08/25/2022    EGFR 54 08/25/2022

## 2022-08-25 NOTE — CONSULTS
[address] 1960, 63 y o  female MRN: [id number]  Unit/Bed#: E2 -01 Encounter: [id number]  Primary Care Provider: Janis Grigsby MD   Date and time admitted to hospital: 8/24/2022  5:08 AM    Consults    * Status post total hip replacement, right  Assessment & Plan  · Management per primary service    HTN (hypertension)  Assessment & Plan  · With borderline low BP due to mild blood loss anemia, recent anesthesia and narcotic pain meds  · Hold BP meds until fully recovered from her surgery  · Monitor for hypotension    Hyperlipidemia  Assessment & Plan  · Continue pravastatin while in the hospital  · Resume zocor on discharge    VTE Prophylaxis: Enoxaparin (Lovenox)  / sequential compression device       Counseling / Coordination of Care Time: 30 minutes  Greater than 50% of total time spent on patient counseling and coordination of care  Collaboration of Care: Were Recommendations Directly Discussed with Primary Treatment Team? - No     History of Present Illness:    Kurtis Rondon is a 63 y o  female who is originally admitted to the orthopedic service due to right hip arthritis  Yesterday she underwent right anterior hip arthroplasty  We are consulted for hypertension and hyperlipidemia  After her procedure and overnight she was noted to have borderline low blood pressure  This morning she had slight nausea which eventually improved  She tried to do physical therapy but felt dizzy and lightheaded and this had to be aborted  At the time of my examination her nausea and lightheadedness have resolved  She does have some postoperative pain as expected  She still could not stand by herself  At home she takes Norvasc and lisinopril for hypertension and simvastatin for hyperlipidemia    Review of Systems:    Review of Systems   Constitutional: Negative  HENT: Negative  Eyes: Negative  Respiratory: Negative  Cardiovascular: Negative  Gastrointestinal: Positive for nausea  Genitourinary: Negative  Musculoskeletal: Positive for gait problem  Psychiatric/Behavioral: Negative  All other systems reviewed and are negative  Past Medical and Surgical History:     Past Medical History:   Diagnosis Date    HTN (hypertension) 8/24/2022    Hyperlipidemia 8/24/2022    Spontaneous miscarriage        Past Surgical History:   Procedure Laterality Date    COLONOSCOPY      FOOT SURGERY      [place] TOTAL HIP ARTHROPLASTY Right 8/24/2022    Procedure: ARTHROPLASTY HIP TOTAL ANTERIOR;  Surgeon: Claudeen Blizzard, DO;  Location: [facility];  Service: Orthopedics    TOTAL ABDOMINAL HYSTERECTOMY  11/2007    with removal of both ovaries       Meds/Allergies:    PTA meds:   Prior to Admission Medications   Prescriptions Last Dose Informant Patient Reported? Taking?    Biotin 5 MG TBDP Past Week at Unknown time  Yes Yes   Sig: Take 2 tablets by mouth   Calcium Ascorbate 500 MG TABS Past Week at Unknown time  Yes Yes   Sig: Take 500 mg by mouth   Calcium Carb-Cholecalciferol (CALCIUM 1000 + D) 1000-800 MG-UNIT TABS   Yes No   Sig: Take by mouth   Cholecalciferol 1000 units CHEW   Yes No   Sig: Chew   Cholecalciferol 1000 units CHEW   Yes No   Sig: Chew   Cyanocobalamin (VITAMIN B 12 PO) Past Week at Unknown time  Yes Yes   Sig: Take by mouth   LUTEIN PO   Yes No   Sig: Take by mouth   Multiple Vitamin (MULTI VITAMIN DAILY PO) 8/23/2022 at Unknown time  Yes Yes   Sig: Take by mouth   Multiple Vitamin (multivitamin) tablet 8/23/2022  No Yes   Sig: Take 1 tablet by mouth daily Start to take 30 days before surgery   NIFEdipine ER (ADALAT CC) 60 MG 24 hr tablet 8/24/2022 at 0415  Yes Yes   NIFEdipine ER (ADALAT CC) 60 MG 24 hr tablet 8/23/2022  Yes Yes   Sig: TAKE 1 TABLET DAILY   Omega-3 Fatty Acids (FISH OIL PO)   Yes No   Sig: Take 2 g by mouth   ascorbic acid (VITAMIN C) 500 MG tablet 8/23/2022  No Yes   Sig: Take 1 tablet (500 mg total) by mouth daily Start to take 30 days before surgery   ferrous sulfate 324 (65 Fe) mg 2022  No Yes   Sig: Take 1 tablet (324 mg total) by mouth daily before breakfast Start to take 30 days before surgery   fexofenadine (ALLEGRA) 180 MG tablet More than a month at Unknown time  Yes No   Sig: Take 180 mg by mouth   fluticasone (FLONASE) 50 mcg/act nasal spray Past Month  Yes Yes   Si spray into each nostril every 24 hours   folic acid ( Folic Acid) 1 mg tablet 2022  No Yes   Sig: Take 1 tablet (1 mg total) by mouth daily Start to take 30 days before surgery   lisinopril (ZESTRIL) 30 mg tablet 2022  Yes Yes   Sig: TAKE 1 TABLET DAILY   loratadine (CLARITIN) 10 mg tablet   Yes No   Sig: Take by mouth   simvastatin (ZOCOR) 20 mg tablet 2022 at Unknown time  Yes Yes   Sig: Take 20 mg by mouth daily at bedtime      Facility-Administered Medications: None       Allergies: No Known Allergies    Social History:     Marital Status:     Substance Use History:   Social History     Substance and Sexual Activity   Alcohol Use Yes    Comment: Social use     Social History     Tobacco Use   Smoking Status Former Smoker   Smokeless Tobacco Never Used     Social History     Substance and Sexual Activity   Drug Use No       Family History:    Family History   Problem Relation Age of Onset    Other Father         Epilepsy    Other Sister         Cyst, breast; Thyroid cyst    Thyroid disease Sister     Heart failure Maternal Grandfather     Stroke Maternal Grandfather     Diabetes Family     Heart disease Family        Physical Exam:     Vitals:   Blood Pressure: 103/58 (22 0702)  Pulse: 70 (22 0702)  Temperature: 98 2 °F (36 8 °C) (22 0702)  Temp Source: Temporal (22 07)  Respirations: 19 (22 0702)  Height: 5' 6" (167 6 cm) (22 1445)  Weight - Scale: 94 kg (207 lb 3 7 oz) (22 1445)  SpO2: 100 % (22 9951)    Physical Exam  Vitals reviewed     HENT:      Head: Normocephalic and atraumatic  Eyes:      General: No scleral icterus  Cardiovascular:      Rate and Rhythm: Normal rate and regular rhythm  Heart sounds: No murmur heard  No gallop  Pulmonary:      Effort: Pulmonary effort is normal  No respiratory distress  Breath sounds: No wheezing or rales  Abdominal:      General: Abdomen is flat  Palpations: Abdomen is soft  Musculoskeletal:      Cervical back: Neck supple  Comments: Sequential compression devices in place   Neurological:      Mental Status: She is oriented to person, place, and time  Psychiatric:         Mood and Affect: Mood normal          Behavior: Behavior normal            Additional Data:     Lab Results: I have personally reviewed pertinent reports  Results from last 7 days   Lab Units 08/25/22  0426   WBC Thousand/uL 4 73   HEMOGLOBIN g/dL 10 0*   HEMATOCRIT % 31 4*   PLATELETS Thousands/uL 209     Results from last 7 days   Lab Units 08/25/22  0426   SODIUM mmol/L 144   POTASSIUM mmol/L 4 7   CHLORIDE mmol/L 107   CO2 mmol/L 28   BUN mg/dL 13   CREATININE mg/dL 1 10   ANION GAP mmol/L 9   CALCIUM mg/dL 8 4   GLUCOSE RANDOM mg/dL 117             Lab Results   Component Value Date/Time    HGBA1C 5 7 (H) 08/15/2022 10:52 AM    HGBA1C 5 3 04/07/2021 11:18 AM    HGBA1C 5 5 08/14/2020 02:43 PM    HGBA1C 5 7 (H) 02/14/2020 12:13 PM               Imaging: I have personally reviewed pertinent reports  XR hip/pelv 1 vw right if performed   Final Result by Jd Amezquita MD (08/24 1253)      Intact appearing newly placed total right hip arthroplasty, unchanged            Workstation performed: [id number]         XR hip/pelv 1 vw left if performed   Final Result by Mendel Haff, MD (08/24 1251)      Left hip replacement              Workstation performed: [id number]         XR hip/pelv 2-3 vws right if performed    (Results Pending)       EKG, Pathology, and Other Studies Reviewed on Admission:   · EKG: None    ** Please Note: This note has been constructed using a voice recognition system   **

## 2022-08-25 NOTE — ASSESSMENT & PLAN NOTE
· With borderline low BP due to mild blood loss anemia, recent anesthesia and narcotic pain meds  · Hold BP meds until fully recovered from her surgery  · Monitor for hypotension

## 2022-08-25 NOTE — OCCUPATIONAL THERAPY NOTE
Occupational Therapy Evaluation(evaluation lone=2824-9600, tx time=0920-0930)     Patient Name: Mauricio Garza  Today's Date: 8/25/2022  Problem List  Principal Problem:    Status post total hip replacement, right  Active Problems:    HTN (hypertension)    Past Medical History  Past Medical History:   Diagnosis Date    HTN (hypertension) 8/24/2022    Hyperlipidemia 8/24/2022    Spontaneous miscarriage      Past Surgical History  Past Surgical History:   Procedure Laterality Date    COLONOSCOPY      FOOT SURGERY      [place] TOTAL HIP ARTHROPLASTY Right 8/24/2022    Procedure: ARTHROPLASTY HIP TOTAL ANTERIOR;  Surgeon: Bhavani Ortiz DO;  Location: [facility];  Service: Orthopedics    TOTAL ABDOMINAL HYSTERECTOMY  11/2007    with removal of both ovaries         08/25/22 0840   Note Type   Note type Evaluation   Additional Comments and tx   Restrictions/Precautions   Weight Bearing Precautions Per Order Yes   RLE Weight Bearing Per Order WBAT   Other Precautions WBS;THR;Fall Risk;Pain  (anterior THP's=no ER, no hyperextension)   Pain Assessment   Pain Assessment Tool 0-10   Pain Score 7   Pain Location/Orientation Orientation: Right;Location: Hip   Home Living   Type of [address] [place] [address] One level  (4ste)   Bathroom Shower/Tub Tub/shower unit   Bathroom Toilet Raised   Bathroom Equipment Grab bars in shower;Grab bars around toilet; Shower chair;Commode   Home Equipment Walker;Cane   Prior Function   Lives With Louis American  (grandchild)   ADL Assistance Needs assistance  (with LE dressing)   Falls in the last 6 months 1 to 4  (1)   Lifestyle   Autonomy PTA pt states that she had some assistance with her LE ADLs(donning/doffing socks); states independence with her transfers, ambulation--w/o device; +falls=1, +, ocassionally home alone   Reciprocal Relationships supportive family   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 [address] ([address]) WDL   Subjective   Subjective "I had my [facility] hip done at [facility] "   ADL   Where Assessed Edge of bed   Eating Assistance 6  Modified independent   Grooming Assistance 6  Modified Independent   UB Bathing Assistance 5  Supervision/Setup   LB Bathing Assistance 3  Moderate Assistance   [address] 5  Supervision/Setup   LB Dressing Assistance 3  Moderate [address] [place]  5  Supervision/Setup   Bed Mobility   Supine to Sit 4  Minimal assistance   Additional items Assist x 1; Increased time required;Verbal cues;LE management   Transfers   Sit to Stand 4  Minimal assistance   Additional items Assist x 1; Increased time required;Verbal cues   Stand to Sit 4  Minimal assistance   Additional items Assist x 1; Increased time required;Verbal cues   Toilet transfer 4  Minimal assistance   Additional items Assist x 1; Increased time required;Standard toilet   Additional Comments bp's=123/76(EOB), 113/70(sitting in chair, after ambulation); SPO2=% on RA, nsg aware   Functional Mobility   Functional Mobility 4  Minimal assistance   Additional Comments x1   Additional items Rolling walker   Balance   Static Sitting Fair +   Dynamic Sitting Fair   Static Standing Fair -   Dynamic Standing Poor +   Activity Tolerance   Activity Tolerance Patient limited by fatigue;Patient limited by pain   Medical Staff Made Aware nsg, P T    RUE Assessment   RUE Assessment WFL   RUE Strength   RUE Overall Strength Within Functional Limits - able to perform ADL tasks with strength  (4/5 throughout)   LUE Assessment   LUE Assessment WFL   LUE Strength   LUE Overall Strength Within Functional Limits - able to perform ADL tasks with strength  (4/5 throughout)   Hand Function   Gross Motor Coordination Functional   Fine Motor Coordination Functional   Sensation   Light Touch No apparent deficits   Proprioception   Proprioception No apparent deficits   Vision-Basic Assessment   Current Vision   (glasses) Vision - Complex Assessment   Acuity Able to read clock/calendar on wall without difficulty   Perception   Inattention/Neglect Appears intact   Cognition   Overall Cognitive Status WFL   Arousal/Participation Alert   Attention Within functional limits   Orientation Level Oriented X4   Memory Decreased recall of precautions;Decreased short term memory   Following Commands Follows one step commands without difficulty   Assessment   Limitation Decreased UE strength;Decreased ADL status; Decreased Safe judgement during ADL;Decreased endurance;Decreased high-level ADLs   Prognosis Good   Assessment Pt is a 57y/o female admitted to the hospital for an elected s/p R THR(8/24), anterior approach  Pt is currently allowed WBAT R LE, with anterior THP's(no ER, no hyperextension)  Pt with PMH HTN, CHIARA, foot sx, L THR, R TKR(per pt's report)  PTA pt states that she had some assistance with her LE ADLs(donning/doffing socks); states independence with her transfers, ambulation--w/o device; +falls=1, +, ocassionally home alone  During initial eval, pt demonstrated deficits with her functional balance, functional mobility, ADL status, transfers safety, b/l UE strength, and activity tolerance(currently fair=15-20mins)  Pt would benefit from continued OT tx for the above deficits  3-5xwk/1-2wks   Goals   Patient Goals "to feel better"   STG Time Frame   (1-7 days)   Short Term Goal #1 Pt will demonstrate improved activity tolerance to good(20-30mins) and standing tolerance to 3-5mins to assist with ADLs  Short Term Goal #2 Pt will demonstrate proper walker/transfer safety 100% of the time  Short Term Goal  Pt will independently demonstrate knowledge and application of proper THP's 100% of the time  LTG Time Frame   (7-14 days)   Long Term Goal #1 Pt will demonstrate g/g- balance with all functional activities  Long Term Goal #2 Pt will demonstrate mod I with their UE and LE bathing/dresssing     Long Term Goal Pt will demonstrate mod I with their sit-stand transfers to assist with completion of their LE dressing  Plan   Treatment Interventions ADL retraining;Functional transfer training;UE strengthening/ROM; Endurance training;Patient/family training;Equipment evaluation/education; Compensatory technique education   Goal Expiration Date 09/08/22   OT Treatment Day 0   OT Frequency 3-5x/wk   Additional Treatment Session   Start Time 0920   End Time 0930   Treatment Assessment Pt seen for 10 min tx session with focus on education  Therapist reviewed anterior THP's and provided handout  Pt required verbal/physical cues with application of THP's  Tx tolerated well     Additional Treatment Day 1   Recommendation   OT Discharge Recommendation   (continue PT/OT)   Equipment Recommended   (hip kit)   AM-PAC Daily Activity Inpatient   Lower Body Dressing 2   Bathing 2   Toileting 4   Upper Body Dressing 4   Grooming 4   Eating 4   Daily Activity Raw Score 20   Daily Activity Standardized Score (Calc for Raw Score >=11) 42 03   AM-PAC Applied Cognition Inpatient   Following a Speech/Presentation 4   Understanding Ordinary Conversation 4   Taking Medications 4   Remembering Where Things Are Placed or Put Away 4   Remembering List of 4-5 Errands 4   Taking Care of Complicated Tasks 4   Applied Cognition Raw Score 24   Applied Cognition Standardized Score 62 21   Óscar Mcqueen, OT

## 2022-08-25 NOTE — PLAN OF CARE
Problem: OCCUPATIONAL THERAPY ADULT  Goal: Performs self-care activities at highest level of function for planned discharge setting  See evaluation for individualized goals  Description: Treatment Interventions: ADL retraining, Functional transfer training, UE strengthening/ROM, Endurance training, Patient/family training, Equipment evaluation/education, Compensatory technique education  Equipment Recommended:  (hip kit)       See flowsheet documentation for full assessment, interventions and recommendations  Note: Limitation: Decreased UE strength, Decreased ADL status, Decreased Safe judgement during ADL, Decreased endurance, Decreased high-level ADLs  Prognosis: Good  Assessment: Pt is a 57y/o female admitted to the hospital for an elected s/p R THR(8/24), anterior approach  Pt is currently allowed WBAT R LE, with anterior THP's(no ER, no hyperextension)  Pt with PMH HTN, CHIARA, foot sx, L THR, R TKR(per pt's report)  PTA pt states that she had some assistance with her LE ADLs(donning/doffing socks); states independence with her transfers, ambulation--w/o device; +falls=1, +, ocassionally home alone  During initial eval, pt demonstrated deficits with her functional balance, functional mobility, ADL status, transfers safety, b/l UE strength, and activity tolerance(currently fair=15-20mins)  Pt would benefit from continued OT tx for the above deficits  3-5xwk/1-2wks     OT Discharge Recommendation:  (continue PT/OT)

## 2022-08-25 NOTE — PHYSICAL THERAPY NOTE
PHYSICAL THERAPY TREATMENT NOTE  NAME:  Emigdio Madrigal  DATE: 08/25/22    Length Of Stay: 0  Performed at least 2 patient identifiers during session: Name and ID bracelet    TREATMENT FLOWSHEET:    08/25/22 1418   PT Last Visit   PT Visit Date 08/25/22   Note Type   Note Type Treatment   Pain Assessment   Pain Assessment Tool 0-10   Pain Score 7   Pain Location/Orientation Orientation: Right;Location: Groin   Pain Onset/Description Onset: Ongoing;Frequency: Constant/Continuous   Patient's Stated Pain Goal No pain   Hospital Pain Intervention(s) Cold applied;Repositioned; Ambulation/increased activity   Restrictions/Precautions   Weight Bearing Precautions Per Order Yes   RLE Weight Bearing Per Order WBAT   Other Precautions Fall Risk;Pain;THR;WBS   General   Response to Previous Treatment Patient with no complaints from previous session  Family/Caregiver Present No   Cognition   Overall Cognitive Status WFL   Arousal/Participation Alert; Cooperative   Attention Within functional limits   Orientation Level Oriented X4   Memory Decreased recall of precautions;Decreased short term memory   Following Commands Follows all commands and directions without difficulty   Subjective   Subjective "I don't think I'm ready to start out patient PT yet, I can't even get OOB by myself, how am I going to get ready by myself and my son can only take me late "   Bed Mobility   Rolling R 4  Minimal assistance   Additional items Assist x 1;HOB elevated; Bedrails; Increased time required;Verbal cues   Rolling L 4  Minimal assistance   Additional items Assist x 1;HOB elevated; Bedrails; Increased time required;Verbal cues;LE management   Supine to Sit 4  Minimal assistance   Additional items Assist x 1;HOB elevated; Bedrails; Increased time required;Verbal cues;LE management   Sit to Supine 4  Minimal assistance   Additional items Assist x 1;HOB elevated; Increased time required;Verbal cues;LE management   Additional Comments Cued for proper hand placement and proper technique   Transfers   Sit to Stand 4  Minimal assistance   Additional items Assist x 1; Armrests; Increased time required;Verbal cues   Stand to Sit 4  Minimal assistance   Additional items Assist x 1; Armrests; Increased time required;Verbal cues   Stand pivot 4  Minimal assistance   Additional items Assist x 1; Armrests; Increased time required;Verbal cues   Toilet transfer 4  Minimal assistance   Additional items Assist x 1; Armrests; Increased time required;Verbal cues; Commode  (RW)   Additional Comments Pt  having difficulty advancing RLE at  time  Pt was unable to go to the bathroom once assited onto [facility]   Ambulation/Elevation   Gait pattern Improper Weight shift;Decreased foot clearance; Antalgic; Wide ELIZABETH; Forward Flexion;Decreased R stance; Short stride; Excessively slow; Step to;Decreased heel strike;Decreased toe off   Gait Assistance 4  Minimal assist   Additional items Assist x 1;Verbal cues   Assistive Device Rolling walker   Distance 40ft and 25ft   Ambulation/Elevation Additional Comments Pt  with pain and reporting RLE feels like lead having trouble advancing RLE at times, increased time needed for frequent standing rest periods  Balance   Static Sitting Fair +   Dynamic Sitting Fair   Static Standing Fair -   Dynamic Standing Poor +   Ambulatory Poor +   Endurance Deficit   Endurance Deficit Yes   Endurance Deficit Description pain   Activity Tolerance   Activity Tolerance Patient limited by fatigue;Patient limited by pain   Nurse Made Aware RN aware   Exercises   Quad Sets Sitting;10 reps;AROM; Bilateral   Heelslides Sitting;10 reps;AROM; Bilateral   Glute Sets Sitting;10 reps;AROM; Bilateral   Hip Flexion Sitting;10 reps;AROM; Bilateral   Hip Abduction Sitting;10 reps;AROM; Bilateral   Hip Adduction Sitting;10 reps;AROM; Bilateral   Knee AROM Long Arc Quad Sitting;10 reps;AROM; Bilateral   Ankle Pumps Sitting;10 reps;AROM; Bilateral   Marching Sitting;10 reps;AROM; Bilateral   Neuro re-ed dynamic standing activities performed including multidirectional weight shifting and 360* turns both directions supported by RW and min Ax1   Assessment   Prognosis Good   Problem List Decreased strength;Decreased range of motion;Decreased endurance; Impaired balance;Decreased mobility;Pain;Orthopedic restrictions   Goals   Patient Goals to go home with HHPT   PT Treatment Day 2   Plan   Treatment/Interventions Functional transfer training;LE strengthening/ROM; Elevations; Therapeutic exercise; Endurance training;Patient/family training;Equipment eval/education; Bed mobility;Gait training;Spoke to MD;Spoke to nursing   Progress Progressing toward goals   PT Frequency Twice a day   Recommendation   PT Discharge Recommendation Home with home health rehabilitation  (or OOPT pending progress)   Additional Comments (S)  [facility] recommended   AM-PAC Basic Mobility Inpatient   Turning in Bed Without Bedrails 3   Lying on Back to Sitting on Edge of Flat Bed 3   Moving Bed to Chair 3   Standing Up From Chair 3   Walk in Room 3   Climb 3-5 Stairs 2   Basic Mobility Inpatient Raw Score 17   Basic Mobility Standardized Score 39 67   Highest Level Of Mobility   -[facility] Goal 5: Stand one or more mins   JH-HLM Achieved 7: Walk 25 feet or more       The patient's AM-PAC Basic Mobility Inpatient Short Form Raw Score is 17, Standardized Score is 39 67  A standardized score less than 42 9 suggests the patient may benefit from discharge to post-acute rehabilitation services  Please also refer to the recommendation of the Physical Therapist for safe discharge planning  Pt seen for PT treatment session this date with interventions consisting of bed mobility tasks, transfer training, gait training, neuromuscular re-education of movement to improve dynamic standing balance, seated TE, toilet tranfers, and education provided as needed for safety and direction to improve functional mobility and activity tolerance   Pt agreeable to PT treatment session upon arrival, pt found resting in bed   At end of session, pt left in bed positioned for comfort with SCD's applied and all needs in reach  In comparison to previous session, pt with improvements in ambulation distance   Continue to recommend Home PT  or  OOPT pending progress at time of d/c in order to maximize pt's functional independence and safety w/ mobility  Pt continues to be functioning below baseline level  PT will continue to see pt while here in order to address the deficits listed above and provide interventions consistent w/ POC in effort to achieve STGs      Porsche Pellant, PTA

## 2022-08-26 VITALS
TEMPERATURE: 98.7 F | RESPIRATION RATE: 18 BRPM | SYSTOLIC BLOOD PRESSURE: 124 MMHG | BODY MASS INDEX: 33.3 KG/M2 | DIASTOLIC BLOOD PRESSURE: 78 MMHG | HEIGHT: 66 IN | OXYGEN SATURATION: 95 % | HEART RATE: 97 BPM | WEIGHT: 207.23 LBS

## 2022-08-26 PROBLEM — D62 ABLA (ACUTE BLOOD LOSS ANEMIA): Status: ACTIVE | Noted: 2022-08-26

## 2022-08-26 LAB
ABO GROUP BLD BPU: NORMAL
ABO GROUP BLD BPU: NORMAL
ANION GAP SERPL CALCULATED.3IONS-SCNC: 9 MMOL/L (ref 4–13)
BPU ID: NORMAL
BPU ID: NORMAL
BUN SERPL-MCNC: 10 MG/DL (ref 5–25)
CALCIUM SERPL-MCNC: 9.6 MG/DL (ref 8.3–10.1)
CHLORIDE SERPL-SCNC: 103 MMOL/L (ref 96–108)
CO2 SERPL-SCNC: 30 MMOL/L (ref 21–32)
CREAT SERPL-MCNC: 1.1 MG/DL (ref 0.6–1.3)
CROSSMATCH: NORMAL
CROSSMATCH: NORMAL
ERYTHROCYTE [DISTWIDTH] IN BLOOD BY AUTOMATED COUNT: 14 % (ref 11.6–15.1)
GFR SERPL CREATININE-BSD FRML MDRD: 54 ML/MIN/1.73SQ M
GLUCOSE P FAST SERPL-MCNC: 109 MG/DL (ref 65–99)
GLUCOSE SERPL-MCNC: 109 MG/DL (ref 65–140)
HCT VFR BLD AUTO: 28.4 % (ref 34.8–46.1)
HGB BLD-MCNC: 9.4 G/DL (ref 11.5–15.4)
MCH RBC QN AUTO: 33.2 PG (ref 26.8–34.3)
MCHC RBC AUTO-ENTMCNC: 33.1 G/DL (ref 31.4–37.4)
MCV RBC AUTO: 100 FL (ref 82–98)
PLATELET # BLD AUTO: 200 THOUSANDS/UL (ref 149–390)
PMV BLD AUTO: 10.3 FL (ref 8.9–12.7)
POTASSIUM SERPL-SCNC: 4 MMOL/L (ref 3.5–5.3)
RBC # BLD AUTO: 2.83 MILLION/UL (ref 3.81–5.12)
SODIUM SERPL-SCNC: 142 MMOL/L (ref 135–147)
UNIT DISPENSE STATUS: NORMAL
UNIT DISPENSE STATUS: NORMAL
UNIT PRODUCT CODE: NORMAL
UNIT PRODUCT CODE: NORMAL
UNIT PRODUCT VOLUME: 300 ML
UNIT PRODUCT VOLUME: 350 ML
UNIT RH: NORMAL
UNIT RH: NORMAL
WBC # BLD AUTO: 7.29 THOUSAND/UL (ref 4.31–10.16)

## 2022-08-26 PROCEDURE — 99024 POSTOP FOLLOW-UP VISIT: CPT | Performed by: PHYSICIAN ASSISTANT

## 2022-08-26 PROCEDURE — NC001 PR NO CHARGE: Performed by: PHYSICIAN ASSISTANT

## 2022-08-26 PROCEDURE — 97116 GAIT TRAINING THERAPY: CPT

## 2022-08-26 PROCEDURE — 80048 BASIC METABOLIC PNL TOTAL CA: CPT | Performed by: PHYSICIAN ASSISTANT

## 2022-08-26 PROCEDURE — 97535 SELF CARE MNGMENT TRAINING: CPT

## 2022-08-26 PROCEDURE — 97530 THERAPEUTIC ACTIVITIES: CPT

## 2022-08-26 PROCEDURE — 97110 THERAPEUTIC EXERCISES: CPT

## 2022-08-26 PROCEDURE — 99225 PR SBSQ OBSERVATION CARE/DAY 25 MINUTES: CPT | Performed by: PHYSICIAN ASSISTANT

## 2022-08-26 PROCEDURE — 85027 COMPLETE CBC AUTOMATED: CPT | Performed by: PHYSICIAN ASSISTANT

## 2022-08-26 RX ORDER — OXYCODONE HYDROCHLORIDE 5 MG/1
10 TABLET ORAL EVERY 4 HOURS PRN
Qty: 60 TABLET | Refills: 0 | Status: SHIPPED | OUTPATIENT
Start: 2022-08-26 | End: 2022-09-09 | Stop reason: SDUPTHER

## 2022-08-26 RX ORDER — ACETAMINOPHEN 325 MG/1
975 TABLET ORAL EVERY 8 HOURS SCHEDULED
Refills: 0
Start: 2022-08-26

## 2022-08-26 RX ORDER — GABAPENTIN 100 MG/1
100 CAPSULE ORAL EVERY 8 HOURS
Qty: 90 CAPSULE | Refills: 0 | Status: SHIPPED | OUTPATIENT
Start: 2022-08-26

## 2022-08-26 RX ORDER — SENNOSIDES 8.6 MG
8.6 TABLET ORAL
Qty: 20 TABLET | Refills: 0 | Status: SHIPPED | OUTPATIENT
Start: 2022-08-26

## 2022-08-26 RX ORDER — DOCUSATE SODIUM 100 MG/1
100 CAPSULE, LIQUID FILLED ORAL 2 TIMES DAILY
Qty: 20 CAPSULE | Refills: 0 | Status: SHIPPED | OUTPATIENT
Start: 2022-08-26

## 2022-08-26 RX ORDER — ASPIRIN 325 MG
325 TABLET, DELAYED RELEASE (ENTERIC COATED) ORAL 2 TIMES DAILY
Qty: 84 TABLET | Refills: 0 | Status: SHIPPED | OUTPATIENT
Start: 2022-08-26

## 2022-08-26 RX ADMIN — ENOXAPARIN SODIUM 40 MG: 40 INJECTION SUBCUTANEOUS at 08:26

## 2022-08-26 RX ADMIN — GABAPENTIN 100 MG: 100 CAPSULE ORAL at 06:06

## 2022-08-26 RX ADMIN — OMEGA-3 FATTY ACIDS CAP 1000 MG 2000 MG: 1000 CAP at 08:26

## 2022-08-26 RX ADMIN — OXYCODONE 5 MG: 5 TABLET ORAL at 09:29

## 2022-08-26 RX ADMIN — ACETAMINOPHEN 975 MG: 325 TABLET ORAL at 06:06

## 2022-08-26 RX ADMIN — ACETAMINOPHEN 975 MG: 325 TABLET ORAL at 13:13

## 2022-08-26 RX ADMIN — LORATADINE 10 MG: 10 TABLET ORAL at 08:25

## 2022-08-26 RX ADMIN — OXYCODONE 5 MG: 5 TABLET ORAL at 14:35

## 2022-08-26 RX ADMIN — PRAVASTATIN SODIUM 40 MG: 40 TABLET ORAL at 15:36

## 2022-08-26 RX ADMIN — DOCUSATE SODIUM 100 MG: 100 CAPSULE, LIQUID FILLED ORAL at 08:26

## 2022-08-26 RX ADMIN — Medication 1 TABLET: at 07:10

## 2022-08-26 RX ADMIN — OXYCODONE HYDROCHLORIDE AND ACETAMINOPHEN 500 MG: 500 TABLET ORAL at 08:26

## 2022-08-26 RX ADMIN — GABAPENTIN 100 MG: 100 CAPSULE ORAL at 14:35

## 2022-08-26 RX ADMIN — Medication 1 TABLET: at 15:35

## 2022-08-26 RX ADMIN — FOLIC ACID 1 MG: 1 TABLET ORAL at 08:32

## 2022-08-26 RX ADMIN — DOCUSATE SODIUM 100 MG: 100 CAPSULE, LIQUID FILLED ORAL at 17:12

## 2022-08-26 NOTE — PLAN OF CARE
Problem: PAIN - ADULT  Goal: Verbalizes/displays adequate comfort level or baseline comfort level  Description: Interventions:  - Encourage patient to monitor pain and request assistance  - Assess pain using appropriate pain scale  - Administer analgesics based on type and severity of pain and evaluate response  - Implement non-pharmacological measures as appropriate and evaluate response  - Consider cultural and social influences on pain and pain management  - Notify physician/advanced practitioner if interventions unsuccessful or patient reports new pain  Outcome: Progressing     Problem: Potential for Falls  Goal: Patient will remain free of falls  Description: INTERVENTIONS:  - Educate patient/family on patient safety including physical limitations  - Instruct patient to call for assistance with activity   - Consult OT/PT to assist with strengthening/mobility   - Keep Call bell within reach  - Keep bed low and locked with side rails adjusted as appropriate  - Keep care items and personal belongings within reach  - Initiate and maintain comfort rounds  - Make Fall Risk Sign visible to staff  - Offer Toileting every 2 Hours, in advance of need  - Apply yellow socks and bracelet for high fall risk patients  - Consider moving patient to room near nurses station  Outcome: Progressing     Problem: MOBILITY - ADULT  Goal: Maintain or return to baseline ADL function  Description: INTERVENTIONS:  -  Assess patient's ability to carry out ADLs; assess patient's baseline for ADL function and identify physical deficits which impact ability to perform ADLs (bathing, care of mouth/teeth, toileting, grooming, dressing, etc )  - Assess/evaluate cause of self-care deficits   - Assess range of motion  - Assess patient's mobility; develop plan if impaired  - Assess patient's need for assistive devices and provide as appropriate  - Encourage maximum independence but intervene and supervise when necessary  - Involve family in performance of ADLs  - Assess for home care needs following discharge   - Consider OT consult to assist with ADL evaluation and planning for discharge  - Provide patient education as appropriate  Outcome: Progressing

## 2022-08-26 NOTE — PHYSICAL THERAPY NOTE
PHYSICAL THERAPY TREATMENT NOTE  NAME:  Bernadette Oconnor  DATE: 08/26/22    Length Of Stay: 0  Performed at least 2 patient identifiers during session: Name and ID bracelet    TREATMENT:      08/26/22 1432   PT Last Visit   PT Visit Date 08/26/22   Note Type   Note Type BID visit/treatment   Pain Assessment   Pain Assessment Tool 0-10   Pain Score 6   Pain Location/Orientation Orientation: Right;Location: Hip   Pain Onset/Description Onset: Ongoing;Frequency: Constant/Continuous   Patient's Stated Pain Goal No pain   Hospital Pain Intervention(s) Repositioned; Ambulation/increased activity; Emotional support;Medication (See MAR)   Multiple Pain Sites No   Restrictions/Precautions   Weight Bearing Precautions Per Order Yes   RLE Weight Bearing Per Order WBAT   Other Precautions WBS;THR;Fall Risk;Pain  (anterior THP)   General   Chart Reviewed Yes   Response to Previous Treatment Patient with no complaints from previous session  Family/Caregiver Present No   Cognition   Overall Cognitive Status WFL   Arousal/Participation Alert; Cooperative   Attention Within functional limits   Orientation Level Oriented X4   Memory Within functional limits   Following Commands Follows all commands and directions without difficulty   Comments pleasant and cooperative, agreable to PT treatment   Bed Mobility   Supine to Sit 4  Minimal assistance   Additional items Assist x 1; Increased time required;Verbal cues;LE management   Additional Comments pt OOB in chair to end session   Transfers   Sit to Stand 5  Supervision   Additional items Increased time required;Verbal cues;Armrests   Stand to Sit 5  Supervision   Additional items Armrests; Increased time required;Verbal cues   Stand pivot 5  Supervision   Additional items Increased time required;Verbal cues   Toilet transfer 5  Supervision   Additional items Armrests; Increased time required;Raised toilet seat   Additional Comments verbal cues for safety and technique Ambulation/Elevation   Gait pattern Improper Weight shift;Decreased foot clearance;Decreased R stance; Short stride; Excessively slow;Decreased toe off   Gait Assistance 5  Supervision   Additional items Verbal cues   Assistive Device Rolling walker   Distance 125 ft   Ambulation/Elevation Additional Comments verbal cues for RLE clearance   Balance   Static Sitting Good   Dynamic Sitting Fair +   Static Standing Fair   Dynamic Standing Fair -   Ambulatory Fair -   Endurance Deficit   Endurance Deficit Yes   Activity Tolerance   Activity Tolerance Patient limited by pain; Patient tolerated treatment well   Nurse Made Aware yes   Exercises   Heelslides Sitting;20 reps;AROM; Bilateral   Glute Sets Sitting;20 reps;AROM; Bilateral   Hip Abduction Sitting;20 reps;AROM; Bilateral   Hip Adduction Sitting;20 reps;AROM; Bilateral   Knee AROM Long Arc Quad Sitting;20 reps;AROM; Bilateral   Ankle Pumps Sitting;20 reps;AROM; Bilateral   Marching Sitting;20 reps;AROM; Bilateral   Assessment   Prognosis Good   Problem List Decreased strength;Decreased range of motion;Decreased endurance; Impaired balance;Decreased mobility; Decreased safety awareness;Orthopedic restrictions;Pain   Assessment Pt seen for PT treatment session this date with interventions consisting of gait training w/ emphasis on improving pt's ability to ambulate level surfaces x 125 ft with close S provided by therapist with RW, Therapeutic exercise consisting of: AROM 20 reps B LE in sitting position and therapeutic activity consisting of training: bed mobility, supine<>sit transfers, sit<>stand transfers and toilet transfer  Pt agreeable to PT treatment session upon arrival, pt found supine in bed w/ HOB elevated, in no apparent distress and responsive  In comparison to previous session, pt with improvements in distance ambulated  Post session: pt returned back to recliner, all needs in reach and RN notified of session findings/recommendations   Continue to recommend home with home health rehabilitation at time of d/c in order to maximize pt's functional independence and safety w/ mobility  Pt continues to be functioning below baseline level  PT will continue to see pt during current hospitalization in order to address the deficits listed above and provide interventions consistent w/ POC in effort to achieve STGs  Goals   STG Expiration Date 09/01/22   Plan   Treatment/Interventions Functional transfer training;LE strengthening/ROM; Elevations; Therapeutic exercise; Endurance training;Equipment eval/education; Bed mobility;Gait training; Compensatory technique education;Spoke to nursing;OT   Progress Progressing toward goals   PT Frequency Twice a day   Recommendation   PT Discharge Recommendation Home with home health rehabilitation   [address]   Turning in Bed Without Bedrails 3   Lying on Back to Sitting on Edge of Flat Bed 3   Moving Bed to Chair 3   Standing Up From Chair 4   Walk in Room 3   Climb 3-5 Stairs 3   Basic Mobility Inpatient Raw Score 19   Basic Mobility Standardized Score 42 48   Highest Level Of Mobility   JH-HLM Goal 6: Walk 10 steps or more   JH-HLM Achieved 7: Walk 25 feet or more   Education   Education Provided Mobility training;Precautions for total hip arthroplasty (BRENNON); Assistive device   Patient Demonstrates acceptance/verbal understanding   End of Consult   Patient Position at End of Consult Bedside chair; All needs within reach       The patient's AM-PAC Basic Mobility Inpatient Short Form Raw Score is 19  A Raw score of greater than 16 suggests the patient may benefit from discharge to home  Please also refer to the recommendation of the Physical Therapist for safe discharge planning        Latanya Sor, PTA,PTA

## 2022-08-26 NOTE — PROGRESS NOTES
Progress Note - Orthopedics   Shaila Murray 63 y o  female MRN: [id number]  Unit/Bed#: E2 -01 Encounter: [id number]    Assessment:  63 y o  female s/p right anterior total hip arthroplasty POD 2, ABLA, hypotension    Plan:  · ABLA- post op  Vitals stable  Receiving venofer  Continue to monitor  · Hypotension- resolved  · Pain control prn  · PT/OT-WBAT right LE   · Anterior hip precautions  · Lovenox/TEDs/SCDs for DVT prophylaxis  Patient will be discharged with ASA  · Dc planning- home today once cleared by PT and SLIM  Subjective: Pt S&E  Resting comfortably, eating breakfast in bedside chair  Patient states she is doing much better today  Her nausea, lightheadedness, and pain have all improved  Patient notes her ride is not available until 5 pm today but she is looking forward to going home  She is passing gas  Denies fevers, chills, abd pain, distal numbness, or tingling  Vitals: Blood pressure 124/78, pulse 97, temperature 98 7 °F (37 1 °C), temperature source Temporal, resp  rate 18, height 5' 6" (1 676 m), weight 94 kg (207 lb 3 7 oz), SpO2 95 %  ,Body mass index is 33 45 kg/m²        Intake/Output Summary (Last 24 hours) at 8/26/2022 0817  Last data filed at 8/25/2022 1201  Gross per 24 hour   Intake --   Output 500 ml   Net -500 ml       Invasive Devices  Report    None                 Ortho Exam: rightLE:  Drsg:  C/D/I, sensation grossly intact L4, L5, S1, palpable pedal pulse, EHL/AT/GS intact    Lab, Imaging and other studies:   CBC:   No results found for: WBC, HGB, HCT, MCV, PLT, ADJUSTEDWBC, MCH, MCHC, RDW, MPV, NRBC  CMP:   Lab Results   Component Value Date    SODIUM 142 08/26/2022     08/26/2022    CO2 30 08/26/2022    BUN 10 08/26/2022    CREATININE 1 10 08/26/2022    CALCIUM 9 6 08/26/2022    EGFR 54 08/26/2022

## 2022-08-26 NOTE — PROGRESS NOTES
[address] [place] [address]  Progress Note - Ximena Lopez 1960, 63 y o  female MRN: [id number]  Unit/Bed#: E2 -01 Encounter: [id number]  Primary Care Provider: Francisca Farley MD   Date and time admitted to hospital: 8/24/2022  5:08 AM    * Status post total hip replacement, right  Assessment & Plan  · Management per primary service  · Okay for discharge today from [facility] [address] (acute blood loss anemia)  Assessment & Plan  · Postoperatively  · No indication for transfusion  · Repeat CBC with platelets in 1 week outpatient   · IV Venofer per primary team    Hyperlipidemia  Assessment & Plan  · Continue Zocor at discharge    HTN (hypertension)  Assessment & Plan  · Pressure improved and stabilized today  · Resume home blood pressure medications tomorrow  · Follow-up with PCP outpatient for ongoing blood pressure monitoring        VTE Pharmacologic Prophylaxis: VTE Score: 9 High Risk (Score >/= 5) - Pharmacological DVT Prophylaxis Ordered: enoxaparin (Lovenox)  Sequential Compression Devices Ordered  Patient Centered Rounds: I performed bedside rounds with nursing staff today  Discussions with Specialists or Other Care Team Provider: Theresa Boswell     Education and Discussions with Family / Patient: Patient declined call to   Time Spent for Care: 20 minutes  More than 50% of total time spent on counseling and coordination of care as described above  Current Length of Stay: 0 day(s)  Current Patient Status: Outpatient Surgery   Certification Statement: The patient will continue to require additional inpatient hospital stay due to Status post hip replacement  Discharge Plan: Per primary team    Code Status: Level 1 - Full Code    Subjective:   Patient doing well today  She states she feels much better and feels ready to go home  No chest pain, shortness a breath, abdominal pain nausea or vomiting  No lightheadedness or dizziness      Objective:     Vitals:   Temp (24hrs), Av 5 °F (36 9 °C), Min:97 8 °F (36 6 °C), Max:98 9 °F (37 2 °C)    Temp:  [97 8 °F (36 6 °C)-98 9 °F (37 2 °C)] 98 7 °F (37 1 °C)  HR:  [68-97] 97  Resp:  [18-19] 18  BP: (113-133)/(67-78) 124/78  SpO2:  [95 %-99 %] 95 %  Body mass index is 33 45 kg/m²  Input and Output Summary (last 24 hours): Intake/Output Summary (Last 24 hours) at 2022  Last data filed at 2022 1201  Gross per 24 hour   Intake --   Output 500 ml   Net -500 ml       Physical Exam:   Physical Exam  Vitals and nursing note reviewed  Constitutional:       General: She is not in acute distress  Appearance: She is not toxic-appearing  HENT:      Head: Normocephalic  Cardiovascular:      Rate and Rhythm: Normal rate and regular rhythm  Pulmonary:      Effort: Pulmonary effort is normal       Breath sounds: Normal breath sounds  Abdominal:      General: Bowel sounds are normal       Palpations: Abdomen is soft  Neurological:      Mental Status: She is alert  Mental status is at baseline     Psychiatric:         Mood and Affect: Mood normal           Additional Data:     Labs:  Results from last 7 days   Lab Units 22  0614   WBC Thousand/uL 7 29   HEMOGLOBIN g/dL 9 4*   HEMATOCRIT % 28 4*   PLATELETS Thousands/uL 200     Results from last 7 days   Lab Units 22  0614   SODIUM mmol/L 142   POTASSIUM mmol/L 4 0   CHLORIDE mmol/L 103   CO2 mmol/L 30   BUN mg/dL 10   CREATININE mg/dL 1 10   ANION GAP mmol/L 9   CALCIUM mg/dL 9 6   GLUCOSE RANDOM mg/dL 109                       Lines/Drains:  Invasive Devices  Report    None                           Recent Cultures (last 7 days):         Last 24 Hours Medication List:   Current Facility-Administered Medications   Medication Dose Route Frequency Provider Last Rate    acetaminophen  975 mg Oral [facility] [address] PA-C      ascorbic acid  500 mg Oral Daily [address] PA-C      bisacodyl  10 mg Rectal Daily PRN CARLOS Roa calcium carbonate  1,000 mg Oral Daily PRN Cherrie Barber PA-C      calcium carbonate-vitamin D  1 tablet Oral BID With Meals Ludmila Wyman PA-C      docusate sodium  100 mg [facility] BID [facility]      enoxaparin  40 mg Subcutaneous Daily [facility] CARLOS Wyman      fish oil  2,000 mg Oral Daily [place]      folic acid  1 mg Oral Daily [facility] CARLOS Wyman      gabapentin  100 mg Oral [address] [place] [address]      HYDROmorphone  0 5 mg Intravenous Q4H PRN Ludmila Thornton JulienneCARLOS grace      lactated ringers  125 mL/hr Intravenous Continuous Ludmilajared Wyman PA-C Stopped (08/25/22 0902)    loratadine  10 mg Oral Daily [facility]      NIFEdipine  60 mg Oral Daily [place], [place]      ondansetron  4 mg Intravenous Q6H PRN [facility] CARLOS Wyman      oxyCODONE  10 mg Oral Q4H PRN [facility] CARLOS Wyman      oxyCODONE  5 mg Oral Q4H PRN Ludmila Thornton JulienneCARLOS grace      polyethylene glycol  17 g Oral Daily PRN Cherrie Barber PA-C      pravastatin  40 mg Oral Daily With Dinner Cherrie Barber PA-C      senna  1 tablet Oral HS Cherrie Barber PA-C          Today, Patient Was Seen By: Lillie Escoto PA-C    **Please Note: This note may have been constructed using a voice recognition system  **

## 2022-08-26 NOTE — PLAN OF CARE
Problem: MOBILITY - ADULT  Goal: Maintain or return to baseline ADL function  Description: INTERVENTIONS:  -  Assess patient's ability to carry out ADLs; assess patient's baseline for ADL function and identify physical deficits which impact ability to perform ADLs (bathing, care of mouth/teeth, toileting, grooming, dressing, etc )  - Assess/evaluate cause of self-care deficits   - Assess range of motion  - Assess patient's mobility; develop plan if impaired  - Assess patient's need for assistive devices and provide as appropriate  - Encourage maximum independence but intervene and supervise when necessary  - Involve family in performance of ADLs  - Assess for home care needs following discharge   - Consider OT consult to assist with ADL evaluation and planning for discharge  - Provide patient education as appropriate  Outcome: Progressing  Goal: Maintains/Returns to pre admission functional level  Description: INTERVENTIONS:  - Perform BMAT or MOVE assessment daily    - Set and communicate daily mobility goal to care team and patient/family/caregiver  - Collaborate with rehabilitation services on mobility goals if consulted  - Perform Range of Motion 3 times a day  - Reposition patient every 2 hours    - Dangle patient 3 times a day  - Stand patient 3 times a day  - Ambulate patient 3 times a day  - Out of bed to chair 3 times a day   - Out of bed for meals 3 times a day  - Out of bed for toileting  - Record patient progress and toleration of activity level   Outcome: Progressing     Problem: PAIN - ADULT  Goal: Verbalizes/displays adequate comfort level or baseline comfort level  Description: Interventions:  - Encourage patient to monitor pain and request assistance  - Assess pain using appropriate pain scale  - Administer analgesics based on type and severity of pain and evaluate response  - Implement non-pharmacological measures as appropriate and evaluate response  - Consider cultural and social influences on pain and pain management  - Notify physician/advanced practitioner if interventions unsuccessful or patient reports new pain  Outcome: Progressing     Problem: INFECTION - ADULT  Goal: Absence or prevention of progression during hospitalization  Description: INTERVENTIONS:  - Assess and monitor for signs and symptoms of infection  - Monitor lab/diagnostic results  - Monitor all insertion sites, i e  indwelling lines, tubes, and drains  - Monitor endotracheal if appropriate and nasal secretions for changes in amount and color  - [place] appropriate cooling/warming therapies per order  - Administer medications as ordered  - Instruct and encourage patient and family to use good hand hygiene technique  - Identify and instruct in appropriate isolation precautions for identified infection/condition  Outcome: Progressing  Goal: Absence of fever/infection during neutropenic period  Description: INTERVENTIONS:  - Monitor WBC    Outcome: Progressing     Problem: SAFETY ADULT  Goal: Maintain or return to baseline ADL function  Description: INTERVENTIONS:  -  Assess patient's ability to carry out ADLs; assess patient's baseline for ADL function and identify physical deficits which impact ability to perform ADLs (bathing, care of mouth/teeth, toileting, grooming, dressing, etc )  - Assess/evaluate cause of self-care deficits   - Assess range of motion  - Assess patient's mobility; develop plan if impaired  - Assess patient's need for assistive devices and provide as appropriate  - Encourage maximum independence but intervene and supervise when necessary  - Involve family in performance of ADLs  - Assess for home care needs following discharge   - Consider OT consult to assist with ADL evaluation and planning for discharge  - Provide patient education as appropriate  Outcome: Progressing  Goal: Maintains/Returns to pre admission functional level  Description: INTERVENTIONS:  - Perform BMAT or MOVE assessment daily    - Set and communicate daily mobility goal to care team and patient/family/caregiver     - Collaborate with rehabilitation services on mobility goals if consulted  - Out of bed for toileting  - Record patient progress and toleration of activity level   Outcome: Progressing  Goal: Patient will remain free of falls  Description: INTERVENTIONS:  - Educate patient/family on patient safety including physical limitations  - Instruct patient to call for assistance with activity   - Consult OT/PT to assist with strengthening/mobility   - Keep Call bell within reach  - Keep bed low and locked with side rails adjusted as appropriate  - Keep care items and personal belongings within reach  - Initiate and maintain comfort rounds  - Make Fall Risk Sign visible to staff  - Offer Toileting every 2 Hours, in advance of need  - Initiate/Maintain bed alarm  - Obtain necessary fall risk management equipment: walker  - Apply yellow socks and bracelet for high fall risk patients  - Consider moving patient to room near nurses station  Outcome: Progressing     Problem: DISCHARGE PLANNING  Goal: Discharge to home or other facility with appropriate resources  Description: INTERVENTIONS:  - Identify barriers to discharge w/patient and caregiver  - Arrange for needed discharge resources and transportation as appropriate  - Identify discharge learning needs (meds, wound care, etc )  - Arrange for interpretive services to assist at discharge as needed  - Refer to Case Management Department for coordinating discharge planning if the patient needs post-hospital services based on physician/advanced practitioner order or complex needs related to functional status, cognitive ability, or social support system  Outcome: Progressing     Problem: Knowledge Deficit  Goal: Patient/family/caregiver demonstrates understanding of disease process, treatment plan, medications, and discharge instructions  Description: Complete learning assessment and assess knowledge base   Interventions:  - Provide teaching at level of understanding  - Provide teaching via preferred learning methods  Outcome: Progressing     Problem: Potential for Falls  Goal: Patient will remain free of falls  Description: INTERVENTIONS:  - Educate patient/family on patient safety including physical limitations  - Instruct patient to call for assistance with activity   - Consult OT/PT to assist with strengthening/mobility   - Keep Call bell within reach  - Keep bed low and locked with side rails adjusted as appropriate  - Keep care items and personal belongings within reach  - Initiate and maintain comfort rounds  - Make Fall Risk Sign visible to staff  - Apply yellow socks and bracelet for high fall risk patients  - Consider moving patient to room near nurses station  Outcome: Progressing

## 2022-08-26 NOTE — OCCUPATIONAL THERAPY NOTE
Occupational Therapy Progress Note(time=1400-8534)     Patient Name: Mia Gao  Today's Date: 8/26/2022  Problem List  Principal Problem:    Status post total hip replacement, right  Active Problems:    HTN (hypertension)    Hyperlipidemia    ABLA (acute blood loss anemia)          08/26/22 1400   Note Type   Note Type Treatment   Restrictions/Precautions   Weight Bearing Precautions Per Order Yes   RLE Weight Bearing Per Order WBAT   Other Precautions WBS;THR;Fall Risk;Pain  (anterior THP's)   Pain Assessment   Pain Assessment Tool 0-10   Pain Score 6   Pain Location/Orientation Orientation: Right;Location: Hip   ADL   Where Assessed Chair   Equipment Provided Reacher;Sock aid;Long-handled shoe horn;Long-handled sponge;Dressing stick   Eating Assistance 6  Modified independent   UB Bathing Assistance 5  Supervision/Setup   LB Dressing Assistance 5  Supervision/Setup   LB Dressing Deficit Don/doff R sock; Don/doff L sock   Functional Standing Tolerance   Time 2-3mins   Transfers   Sit to Stand 5  Supervision   Additional items Increased time required;Verbal cues   Stand to Sit 5  Supervision   Additional items Increased time required;Verbal cues   Functional Mobility   Functional Mobility 5  Supervision   Additional items Rolling walker   Cognition   Overall Cognitive Status WFL   Arousal/Participation Alert   Attention Within functional limits   Memory Within functional limits   Following Commands Follows all commands and directions without difficulty   Activity Tolerance   Activity Tolerance Patient limited by fatigue;Patient limited by pain   Medical Staff Made Aware nsg, P T  Assessment   Assessment Pt seen for 55min tx session with focus on functional balance, functional mobility, ADL status, transfer safety, and education  Pt able to tolerate OOB mobility; sitting balance=g/g-, standing balance=f/f-   Therapist reviewed anterior THP's pertaining to sleeping positions, walker safety, LE ADLs, car/shower transfers  Pt able to effective use "hip kit" for LE ADLs; pt purchased equipment  Pt improving with her functional mobility/balance  Tx tolerated well  Will continue  Plan   Treatment Interventions ADL retraining;Functional transfer training; Endurance training;Patient/family training;Equipment evaluation/education; Compensatory technique education   Goal Expiration Date 09/08/22   OT Treatment Day 1   OT Frequency 3-5x/wk   Recommendation   OT Discharge Recommendation Home with home health rehabilitation   Equipment Recommended Hip Kit ($)   AM-PAC Daily Activity Inpatient   Lower Body Dressing 3   Bathing 3   Toileting 4   Upper Body Dressing 4   Grooming 4   Eating 4   Daily Activity Raw Score 22   Daily Activity Standardized Score (Calc for Raw Score >=11) 47  [address] [place]   Following a Speech/Presentation 4   Understanding Ordinary Conversation 4   Taking Medications 4   Remembering Where Things Are Placed or Put Away 4   Remembering List of 4-5 Errands 4   Taking Care of Complicated Tasks 4   Applied Cognition Raw Score 24   Applied Cognition Standardized Score 62 21   Delilah Fan, OT

## 2022-08-26 NOTE — ASSESSMENT & PLAN NOTE
· Pressure improved and stabilized today  · Resume home blood pressure medications tomorrow  · Follow-up with PCP outpatient for ongoing blood pressure monitoring

## 2022-08-26 NOTE — CASE MANAGEMENT
Case Management Discharge Planning Note    Patient name Evan Dunne  Location [facility] /E2 -* MRN [id number]  : 1960 Date 2022       Current Admission Date: 2022  Current Admission Diagnosis:Status post total hip replacement, right   Patient Active Problem List    Diagnosis Date Noted    ABLA (acute blood loss anemia) 2022    HTN (hypertension) 2022    Hyperlipidemia 2022    Status post total hip replacement, right 2022      LOS (days): 0  Geometric Mean LOS (GMLOS) (days):   Days to GMLOS:     OBJECTIVE:            Current admission status: Outpatient Surgery   Preferred Pharmacy:   [address] [place] [address]  Phone: [phone] Fax: [phone]    Primary Care Provider: Rene Hernandez MD    Primary Insurance: [facility]  Secondary Insurance:     DISCHARGE DETAILS:    Discharge planning discussed with[de-identified] Patient  Freedom of Choice: Yes  Comments - Freedom of Choice: Pt would like to discharge with home health  No preference on agency  [place] referral placed  Awaiting accepting agency    CM contacted family/caregiver?: No- see comments (pt declined)  Were Treatment Team discharge recommendations reviewed with patient/caregiver?: Yes  Did patient/caregiver verbalize understanding of patient care needs?: Yes  Were patient/caregiver advised of the risks associated with not following Treatment Team discharge recommendations?: Yes         [facility]         Is the patient interested in [facility] at discharge?: Yes  [address] requested[de-identified] Physical Therapy, Occupational [address] Name[de-identified] Other  [address] [place] [address] Provider[de-identified] Referring Provider  Home Health Services Needed[de-identified] Evaluate Functional Status and Safety, Gait/ADL Training, Strengthening/Theraputic Exercises to Improve Function  Homebound Criteria Met[de-identified] Requires the Assistance of Another Person for Safe Ambulation or to Leave the Home, Uses an Assist Device (i e  cane, walker, etc)  Supporting Clincal Findings[de-identified] Limited Endurance, Fatigues Easliy in [employer]         Other Referral/Resources/Interventions Provided:  Interventions: [facility]         [facility]: [facility]  Discharge Destination Plan[de-identified] Home with [place] at Discharge : Auto with designated                        Accompanied by: Family member              Additional Comments: CM met with pt at the bedside  Pt reports being agreeable to going home with home health  Agreeable to blanket referral  [place] referral placed by this CM  Awaiting accepting agency  CM will continue to follow

## 2022-08-26 NOTE — PLAN OF CARE
Problem: OCCUPATIONAL THERAPY ADULT  Goal: Performs self-care activities at highest level of function for planned discharge setting  See evaluation for individualized goals  Description: Treatment Interventions: ADL retraining, Functional transfer training, Endurance training, Patient/family training, Equipment evaluation/education, Compensatory technique education  Equipment Recommended: Hip Kit ($)       See flowsheet documentation for full assessment, interventions and recommendations  Outcome: Progressing  Note: Limitation: Decreased UE strength, Decreased ADL status, Decreased Safe judgement during ADL, Decreased endurance, Decreased high-level ADLs  Prognosis: Good  Assessment: Pt seen for 55min tx session with focus on functional balance, functional mobility, ADL status, transfer safety, and education  Pt able to tolerate OOB mobility; sitting balance=g/g-, standing balance=f/f-  Therapist reviewed anterior THP's pertaining to sleeping positions, walker safety, LE ADLs, car/shower transfers  Pt able to effective use "hip kit" for LE ADLs; pt purchased equipment  Pt improving with her functional mobility/balance  Tx tolerated well  Will continue       OT Discharge Recommendation: Home with home health rehabilitation

## 2022-08-26 NOTE — ASSESSMENT & PLAN NOTE
· Postoperatively  · No indication for transfusion  · Repeat CBC with platelets in 1 week outpatient   · IV Venofer per primary team

## 2022-08-26 NOTE — PLAN OF CARE
Problem: PHYSICAL THERAPY ADULT  Goal: Performs mobility at highest level of function for planned discharge setting  See evaluation for individualized goals  Description: Treatment/Interventions: Functional transfer training, LE strengthening/ROM, Elevations, Therapeutic exercise, Endurance training, Patient/family training, Bed mobility, Gait training, Spoke to nursing, OT  Equipment Recommended: Temi Taylor (pt has RW)       See flowsheet documentation for full assessment, interventions and recommendations  8/26/2022 1502 by Ebony Tinoco PTA  Outcome: Progressing  Note: Prognosis: Good  Problem List: Decreased strength, Decreased range of motion, Decreased endurance, Impaired balance, Decreased mobility, Decreased safety awareness, Orthopedic restrictions, Pain  Assessment: Pt seen for PT treatment session this date with interventions consisting of gait training w/ emphasis on improving pt's ability to ambulate level surfaces x 125 ft with close S provided by therapist with RW, Therapeutic exercise consisting of: AROM 20 reps B LE in sitting position and therapeutic activity consisting of training: bed mobility, supine<>sit transfers, sit<>stand transfers and toilet transfer  Pt agreeable to PT treatment session upon arrival, pt found supine in bed w/ HOB elevated, in no apparent distress and responsive  In comparison to previous session, pt with improvements in distance ambulated  Post session: pt returned back to recliner, all needs in reach and RN notified of session findings/recommendations  Continue to recommend home with home health rehabilitation at time of d/c in order to maximize pt's functional independence and safety w/ mobility  Pt continues to be functioning below baseline level  PT will continue to see pt during current hospitalization in order to address the deficits listed above and provide interventions consistent w/ POC in effort to achieve STGs          PT Discharge Recommendation: Home with home health rehabilitation    See flowsheet documentation for full assessment  8/26/2022 1251 by Theresa Hatfield PTA  Outcome: Progressing  Note: Prognosis: Good  Problem List: Decreased strength, Decreased range of motion, Decreased endurance, Impaired balance, Decreased mobility, Orthopedic restrictions, Pain  Assessment: Pt seen for PT treatment session this date with interventions consisting of gait training w/ emphasis on improving pt's ability to ambulate level surfaces x 20 ftx2, 40 ftx1, 30 ftx1 with close S provided by therapist with RW, Therapeutic exercise consisting of: AROM 15 reps B LE in sitting position, therapeutic activity consisting of training: bed mobility, stand pivot transfers towards both direction and toilet transfer and navigating 3 stairs w/ B handrail with step to pattern with CGA  Pt agreeable to PT treatment session upon arrival, pt found seated OOB in chair, in no apparent distress and responsive  In comparison to previous session, pt with improvements in distance ambulated, amount of assistance required and ability to navigate stairs  Post session: pt returned BTB, all needs in reach and RN notified of session findings/recommendations  Continue to recommend home with home health rehabilitation at time of d/c in order to maximize pt's functional independence and safety w/ mobility  Pt continues to be functioning below baseline level  PT will continue to see pt during current hospitalization in order to address the deficits listed above and provide interventions consistent w/ POC in effort to achieve STGs  PT Discharge Recommendation: Home with home health rehabilitation    See flowsheet documentation for full assessment

## 2022-08-26 NOTE — PHYSICAL THERAPY NOTE
PHYSICAL THERAPY TREATMENT NOTE  NAME:  Lily Rodas  DATE: 08/26/22    Length Of Stay: 0  Performed at least 2 patient identifiers during session: Name and ID bracelet    TREATMENT:      08/26/22 1109   PT Last Visit   PT Visit Date 08/26/22   Note Type   Note Type Treatment   Pain Assessment   Pain Assessment Tool 0-10   Pain Score 6   Pain Location/Orientation Orientation: Right;Location: Hip   Pain Onset/Description Onset: Ongoing;Frequency: Constant/Continuous   Patient's Stated Pain Goal No pain   Hospital Pain Intervention(s) Ambulation/increased activity;Repositioned;Medication (See MAR); Emotional support   Multiple Pain Sites No   Precautions   Total Hip Replacement Other (Comment)  (anterior precautions)   Restrictions/Precautions   Weight Bearing Precautions Per Order Yes   RLE Weight Bearing Per Order WBAT   Other Precautions WBS;THR;Fall Risk;Pain  (anterior THP)   General   Chart Reviewed Yes   Response to Previous Treatment Patient with no complaints from previous session  Family/Caregiver Present No   Cognition   Overall Cognitive Status WFL   Arousal/Participation Alert; Cooperative   Attention Within functional limits   Orientation Level Oriented X4   Memory Decreased recall of precautions   Following Commands Follows all commands and directions without difficulty   Comments pt agreeable to PT session   Bed Mobility   Sit to Supine 4  Minimal assistance   Additional items Assist x 1;Bedrails; Increased time required;Verbal cues;LE management   Additional Comments pt OOB in chair upon arrival   Transfers   Sit to Stand 5  Supervision   Additional items Armrests; Increased time required   Stand to Sit 5  Supervision   Additional items Armrests; Increased time required   Stand pivot 5  Supervision   Additional items Increased time required;Verbal cues   Toilet transfer 5  Supervision   Additional items Raised toilet seat; Increased time required   Additional Comments pt with difficulty advancing RLE at times, but increases as ambulation progresses   Ambulation/Elevation   Gait pattern Improper Weight shift;Decreased foot clearance;Decreased R stance; Short stride; Step to;Excessively slow;Decreased toe off   Gait Assistance 5  Supervision   Additional items Verbal cues   Assistive Device Rolling walker   Distance 40 ftx1, 30 ftx1, 20 ftx2   Stair Management Assistance 5  Supervision   Additional items Assist x 1;Verbal cues   Stair Management Technique Two rails; Step to pattern   Number of Stairs 3   Ambulation/Elevation Additional Comments verbal cues for proper sequencing on stairs   Balance   Static Sitting Good   Dynamic Sitting Fair +   Static Standing Fair   Dynamic Standing Fair   Ambulatory Fair -   Endurance Deficit   Endurance Deficit Yes   Activity Tolerance   Activity Tolerance Patient tolerated treatment well;Patient limited by pain   Nurse Made Aware yes   Exercises   Quad Sets Sitting;15 reps;AROM; Bilateral   Heelslides Sitting;15 reps;AROM; Bilateral   Glute Sets Sitting;15 reps;AROM; Bilateral   Hip Abduction Sitting;15 reps;AROM; Bilateral   Hip Adduction Sitting;15 reps;AROM; Bilateral   Knee AROM Long Arc Quad Sitting;15 reps;AROM; Bilateral   Ankle Pumps Sitting;15 reps;AROM; Bilateral   Marching Sitting;15 reps;AROM; Bilateral   Assessment   Prognosis Good   Problem List Decreased strength;Decreased range of motion;Decreased endurance; Impaired balance;Decreased mobility;Orthopedic restrictions;Pain   Assessment Pt seen for PT treatment session this date with interventions consisting of gait training w/ emphasis on improving pt's ability to ambulate level surfaces x 20 ftx2, 40 ftx1, 30 ftx1 with close S provided by therapist with RW, Therapeutic exercise consisting of: AROM 15 reps B LE in sitting position, therapeutic activity consisting of training: bed mobility, stand pivot transfers towards both direction and toilet transfer and navigating 3 stairs w/ B handrail with step to pattern with CGA  Pt agreeable to PT treatment session upon arrival, pt found seated OOB in chair, in no apparent distress and responsive  In comparison to previous session, pt with improvements in distance ambulated, amount of assistance required and ability to navigate stairs  Post session: pt returned BTB, all needs in reach and RN notified of session findings/recommendations  Continue to recommend home with home health rehabilitation at time of d/c in order to maximize pt's functional independence and safety w/ mobility  Pt continues to be functioning below baseline level  PT will continue to see pt during current hospitalization in order to address the deficits listed above and provide interventions consistent w/ POC in effort to achieve STGs  Goals   Patient Goals to go home with HHPT   STG Expiration Date 09/01/22   PT Treatment Day 3   Plan   Treatment/Interventions Functional transfer training;LE strengthening/ROM; Elevations; Therapeutic exercise; Endurance training;Bed mobility;Gait training;Spoke to nursing;Spoke to case management;Spoke to advanced practitioner   Progress Progressing toward goals   PT Frequency Twice a day   Recommendation   PT Discharge Recommendation Home with home health rehabilitation   Elton Emerson [address]   Turning in Bed Without Bedrails 3   Lying on Back to Sitting on Edge of Flat Bed 3   Moving Bed to Chair 3   Standing Up From Chair 3   Walk in Room 3   Climb 3-5 Stairs 3   Basic Mobility Inpatient Raw Score 18   Basic Mobility Standardized Score 41 05   Highest Level Of Mobility   JH-HLM Goal 6: Walk 10 steps or more   JH-HLM Achieved 7: Walk 25 feet or more   Education   Education Provided Mobility training;Precautions for total hip arthroplasty (BRENNON); Assistive device   Patient Demonstrates acceptance/verbal understanding   End of Consult   Patient Position at End of Consult Supine; All needs within reach       The patient's AM-PAC Basic Mobility Inpatient Short Form Raw Score is 18  A Raw score of greater than 16 suggests the patient may benefit from discharge to home  Please also refer to the recommendation of the Physical Therapist for safe discharge planning        Mars Caballero, PTA,PTA

## 2022-08-26 NOTE — PLAN OF CARE
Problem: PHYSICAL THERAPY ADULT  Goal: Performs mobility at highest level of function for planned discharge setting  See evaluation for individualized goals  Description: Treatment/Interventions: Functional transfer training, LE strengthening/ROM, Elevations, Therapeutic exercise, Endurance training, Patient/family training, Bed mobility, Gait training, Spoke to nursing, OT  Equipment Recommended: Dilma Socks (pt has RW)       See flowsheet documentation for full assessment, interventions and recommendations  Outcome: Progressing  Note: Prognosis: Good  Problem List: Decreased strength, Decreased range of motion, Decreased endurance, Impaired balance, Decreased mobility, Orthopedic restrictions, Pain  Assessment: Pt seen for PT treatment session this date with interventions consisting of gait training w/ emphasis on improving pt's ability to ambulate level surfaces x 20 ftx2, 40 ftx1, 30 ftx1 with close S provided by therapist with RW, Therapeutic exercise consisting of: AROM 15 reps B LE in sitting position, therapeutic activity consisting of training: bed mobility, stand pivot transfers towards both direction and toilet transfer and navigating 3 stairs w/ B handrail with step to pattern with CGA  Pt agreeable to PT treatment session upon arrival, pt found seated OOB in chair, in no apparent distress and responsive  In comparison to previous session, pt with improvements in distance ambulated, amount of assistance required and ability to navigate stairs  Post session: pt returned BTB, all needs in reach and RN notified of session findings/recommendations  Continue to recommend home with home health rehabilitation at time of d/c in order to maximize pt's functional independence and safety w/ mobility  Pt continues to be functioning below baseline level   PT will continue to see pt during current hospitalization in order to address the deficits listed above and provide interventions consistent w/ POC in effort to achieve STGs  PT Discharge Recommendation: Home with home health rehabilitation    See flowsheet documentation for full assessment

## 2022-08-29 ENCOUNTER — TELEPHONE (OUTPATIENT)
Dept: OBGYN CLINIC | Facility: HOSPITAL | Age: 62
End: 2022-08-29

## 2022-08-29 NOTE — DISCHARGE SUMMARY
Orthopaedic Surgery - Discharge Summary  Emigdio Madrigal (83 y o  female)   : 1960   MRN: [id number]  Encounter: [id number]   Unit/Bed#: E2 -01    Admission Date: 2022    Discharge Date: 2022  Discharge Diagnosis: Arthritis of right hip [M16 11]    Procedures Performed: Procedure(s) (LRB):  ARTHROPLASTY HIP TOTAL ANTERIOR (Right)    Hospital Course: Emigdio Madrigal is a 63 y o  female admitted on 2022 with osteoarthritis of the R hip  Throughout the postoperative period, the patient remained afebrile with stable vital signs  At the time of discharge on 2022, the patient was tolerating PO diet, voiding, and pain was well-controlled on oral medications  Significant Findings, Care, Treatment and Services Provided: None    Complications: None    Condition at Discharge: good     Discharge instructions:   See After Visit Summary for discharge instructions  Follow-Up Care:  See After Visit Summary for information related to follow-up care  Disposition: Home    Planned Readmission: No    Discharge Statement   I spent 20 minutes discharging the patient  This time was spent on the day of discharge  I had direct contact with the patient on the day of discharge  Discharge Medications:  See after visit summary for reconciled discharge medications provided to patient and family      Lena Chow PA-C

## 2022-08-29 NOTE — TELEPHONE ENCOUNTER
Nicho Tolliver from [facility] called to request a copy of the PT script be faxed to [phone]   Thank you

## 2022-09-08 ENCOUNTER — OFFICE VISIT (OUTPATIENT)
Dept: OBGYN CLINIC | Facility: CLINIC | Age: 62
End: 2022-09-08

## 2022-09-08 VITALS
HEART RATE: 76 BPM | WEIGHT: 207 LBS | HEIGHT: 66 IN | BODY MASS INDEX: 33.27 KG/M2 | SYSTOLIC BLOOD PRESSURE: 104 MMHG | DIASTOLIC BLOOD PRESSURE: 68 MMHG

## 2022-09-08 DIAGNOSIS — Z96.641 STATUS POST TOTAL HIP REPLACEMENT, RIGHT: Primary | ICD-10-CM

## 2022-09-08 PROCEDURE — 99024 POSTOP FOLLOW-UP VISIT: CPT | Performed by: ORTHOPAEDIC SURGERY

## 2022-09-08 RX ORDER — CEPHALEXIN 500 MG/1
500 CAPSULE ORAL EVERY 8 HOURS SCHEDULED
Qty: 21 CAPSULE | Refills: 0 | Status: SHIPPED | OUTPATIENT
Start: 2022-09-08 | End: 2022-09-15

## 2022-09-08 NOTE — PROGRESS NOTES
Assessment/Plan:  1  Status post total hip replacement, right      Orders Placed This Encounter   Procedures    XR hip/pelv 2-3 vws right if performed       · Patient has small open area at proximal incision  The prineo dressing was peeled away from the proximal incision and trimmed  Remaining prineo left intact  We discussed that she should avoid any clothing that will rub across her incision and she should keep her incision covered with a dry dressing at all times  She was advised to call if she develops any drainage, increased redness, fevers, or chills  Script provided for keflex x 7 days  · Continue outpatient PT   · Pain control prn- oxycodone, gabapentin, and tylenol  Patient aware to wean away from opioids  Patient will call if she needs a refill of oxycodone  · Continue with  BID for DVT prophylaxis  We discussed that ASA should be taken twice daily, not just once  · MIKE stockings as needed for swelling  · May shower and let water run over incision, do not submerge incision  · Continue with anterior hip precautions  · Patient should call ahead for abx prior to dental appts  Return in about 1 week (around 9/15/2022) for recheck incision  I answered all of the patient's questions during the visit and provided education of the patient's condition during the visit  The patient verbalized understanding of the information given and agrees with the plan  This note was dictated using GLG software  It may contain errors including improperly dictated words  Please contact physician directly for any questions  Subjective   Chief Complaint:   Chief Complaint   Patient presents with    Right Hip - Post-op       Duarte Cespedes is a 63 y o  female who presents for 2 week follow up s/p right anterior BRENNON on 8/24/22  Patient states she is doing alright  Patient reports that she has most of her pain around PT  She is taking gabapentin 100 mg BID and oxycodone 5-10 mg every 4-6 hours   She has not been taking tylenol  She states she usually takes the ASA once per day  She is attending outpatient PT  Patient is using a walker for assistance ambulating  Patient states she has not noted any issues with her incision  Denies fevers, chills, distal numbness, or tingling  Feels leg lengths are equal      Review of Systems  ROS:    See HPI for musculoskeletal review  All other systems reviewed are negative     History:  Past Medical History:   Diagnosis Date    HTN (hypertension) 8/24/2022    Hyperlipidemia 8/24/2022    Spontaneous miscarriage      Past Surgical History:   Procedure Laterality Date    COLONOSCOPY      FOOT SURGERY      [place] TOTAL HIP ARTHROPLASTY Right 8/24/2022    Procedure: ARTHROPLASTY HIP TOTAL ANTERIOR;  Surgeon: Rogelio Barahona DO;  Location: [facility];  Service: Orthopedics    TOTAL ABDOMINAL HYSTERECTOMY  11/2007    with removal of both ovaries     Social History   Social History     Substance and Sexual Activity   Alcohol Use Yes    Comment: Social use     Social History     Substance and Sexual Activity   Drug Use No     Social History     Tobacco Use   Smoking Status Former Smoker   Smokeless Tobacco Never Used     Family History:   Family History   Problem Relation Age of Onset    Other Father         Epilepsy    Other Sister         Cyst, breast; Thyroid cyst    Thyroid disease Sister     Heart failure Maternal Grandfather     Stroke Maternal Grandfather     Diabetes Family     Heart disease Family        Current Outpatient Medications on File Prior to Visit   Medication Sig Dispense Refill    acetaminophen (TYLENOL) 325 mg tablet Take 3 tablets (975 mg total) by mouth every 8 (eight) hours  0    ascorbic acid (VITAMIN C) 500 MG tablet Take 1 tablet (500 mg total) by mouth daily Start to take 30 days before surgery 30 tablet 1    aspirin (ECOTRIN) 325 mg EC tablet Take 1 tablet (325 mg total) by mouth 2 (two) times a day Take with food   84 tablet 0    Biotin 5 MG TBDP Take 2 tablets by mouth      Calcium Ascorbate 500 MG TABS Take 500 mg by mouth      Calcium Carb-Cholecalciferol (CALCIUM 1000 + D) 1000-800 MG-UNIT TABS Take by mouth      Cholecalciferol 1000 units CHEW Chew      Cholecalciferol 1000 units CHEW Chew      Cyanocobalamin (VITAMIN B 12 PO) Take by mouth      docusate sodium (COLACE) 100 mg capsule Take 1 capsule (100 mg total) by mouth 2 (two) times a day 20 capsule 0    ferrous sulfate 324 (65 Fe) mg Take 1 tablet (324 mg total) by mouth daily before breakfast Start to take 30 days before surgery 30 tablet 1    fexofenadine (ALLEGRA) 180 MG tablet Take 180 mg by mouth      fluticasone (FLONASE) 50 mcg/act nasal spray 1 spray into each nostril every 24 hours      folic acid (KP Folic Acid) 1 mg tablet Take 1 tablet (1 mg total) by mouth daily Start to take 30 days before surgery 30 tablet 1    gabapentin (NEURONTIN) 100 mg capsule Take 1 capsule (100 mg total) by mouth every 8 (eight) hours 90 capsule 0    lisinopril (ZESTRIL) 30 mg tablet TAKE 1 TABLET DAILY      loratadine (CLARITIN) 10 mg tablet Take by mouth      LUTEIN PO Take by mouth      Multiple Vitamin (MULTI VITAMIN DAILY PO) Take by mouth      NIFEdipine ER (ADALAT CC) 60 MG 24 hr tablet       Omega-3 Fatty Acids (FISH OIL PO) Take 2 g by mouth      oxyCODONE (ROXICODONE) 5 immediate release tablet Take 2 tablets (10 mg total) by mouth every 4 (four) hours as needed for severe pain Or take 1 tablet (5 mg total) by mouth every 4 (four) hours as needed for moderate pain  Max Daily Amount: 60 mg 60 tablet 0    senna (SENOKOT) 8 6 mg Take 1 tablet (8 6 mg total) by mouth daily at bedtime 20 tablet 0    simvastatin (ZOCOR) 20 mg tablet Take 20 mg by mouth daily at bedtime       No current facility-administered medications on file prior to visit       No Known Allergies     Objective     /68   Pulse 76   Ht 5' 6" (1 676 m)   Wt 93 9 kg (207 lb)   BMI 33 41 kg/m² PE:  AAOx 3  WDWN  Hearing intact, no drainage from eyes  no audible wheezing  no abdominal distension  LE compartments soft, AT/GS intact    Ortho Exam:  right hip:   INC: prineo dressing covering incision, proximal incision with small open area, proximal prineo has peeled back and collected in the skin fold, No erythema, no active drainage, mild swelling  No pain with ROM  Leg length appear equal    Imaging Studies: I have personally reviewed pertinent films in PACS  XR right hip:  S/p BRENNON, adequate alignment      Scribe Attestation    I,:  Sarah Sierra PA-C am acting as a scribe while in the presence of the attending physician :       I,:  Bhavani Ortiz DO personally performed the services described in this documentation    as scribed in my presence :

## 2022-09-09 ENCOUNTER — TELEPHONE (OUTPATIENT)
Dept: OBGYN CLINIC | Facility: HOSPITAL | Age: 62
End: 2022-09-09

## 2022-09-09 DIAGNOSIS — Z96.641 STATUS POST TOTAL HIP REPLACEMENT, RIGHT: ICD-10-CM

## 2022-09-09 RX ORDER — OXYCODONE HYDROCHLORIDE 5 MG/1
5 TABLET ORAL EVERY 4 HOURS PRN
Qty: 42 TABLET | Refills: 0 | Status: SHIPPED | OUTPATIENT
Start: 2022-09-09

## 2022-09-09 NOTE — TELEPHONE ENCOUNTER
Pt contacted Call Center requested refill of their medication  Medication Name:  oxycodone    Dosage of Med:  10 mg    Frequency of Med:  Every fours hours as needed    Remaining Medication:  16    Pharmacy and Location:  [address] [place] [id number]   Phone:  [phone]  Fax:  [phone]       Pt  Preferred Callback Phone Number:  [phone]    Thank you

## 2022-09-16 ENCOUNTER — OFFICE VISIT (OUTPATIENT)
Dept: OBGYN CLINIC | Facility: MEDICAL CENTER | Age: 62
End: 2022-09-16

## 2022-09-16 VITALS
HEIGHT: 66 IN | HEART RATE: 96 BPM | BODY MASS INDEX: 33.27 KG/M2 | SYSTOLIC BLOOD PRESSURE: 108 MMHG | DIASTOLIC BLOOD PRESSURE: 71 MMHG | WEIGHT: 207 LBS

## 2022-09-16 DIAGNOSIS — Z96.641 STATUS POST TOTAL HIP REPLACEMENT, RIGHT: Primary | ICD-10-CM

## 2022-09-16 PROCEDURE — 99024 POSTOP FOLLOW-UP VISIT: CPT | Performed by: ORTHOPAEDIC SURGERY

## 2022-09-16 NOTE — PROGRESS NOTES
Assessment/Plan:  1  Status post total hip replacement, right      No orders of the defined types were placed in this encounter  · Patient still has small open area at proximal incision  Does appear improved compared to last visit and does not appear infected however patient was advised to continue to monitor closely  We discussed at length her medication regimen and that her keflex and ASA should always be taken as prescribed  She has missed multiple doses of her antibiotic  She was instructed to clean her incision with betadine and cover with a dry dressing at all times  She should not wear any clothing that will rub across her incision  Patient advised to call if she develops any drainage, redness, fevers, or chills  · Continue outpatient PT   · Pain control prn- oxycodone, gabapentin, and tylenol  Patient aware to wean away from opioids  · Continue with ASA for DVT prophylaxis  · MIKE stockings as needed for swelling  · May shower and let water run over incision, do not submerge incision  · Continue with anterior hip precautions  · Patient should call ahead for abx prior to dental appts  Return in about 1 week (around 9/23/2022) for recheck incision   I answered all of the patient's questions during the visit and provided education of the patient's condition during the visit  The patient verbalized understanding of the information given and agrees with the plan  This note was dictated using Dobleas software  It may contain errors including improperly dictated words  Please contact physician directly for any questions  Subjective   Chief Complaint:   Chief Complaint   Patient presents with    Right Hip - Post-op, Follow-up       Chyna Kaminski is a 63 y o  female who presents for 3 week follow up s/p right anterior BRENNON on 8/24/22  Patient is here for incision check  Patient was placed on keflex 500 mg TID at her last visit   She reports that she often forgets to take her antibiotic and still has multiple doses left  She reports that she has also missed several doses of ASA  States she is struggling to remember her medication regimens  She reports wearing loose clothing at home and keeping her incision covered most times  Denies any drainage from incision, fevers, or chills  Review of Systems  ROS:    See HPI for musculoskeletal review  All other systems reviewed are negative     History:  Past Medical History:   Diagnosis Date    HTN (hypertension) 8/24/2022    Hyperlipidemia 8/24/2022    Spontaneous miscarriage      Past Surgical History:   Procedure Laterality Date    COLONOSCOPY      FOOT SURGERY      [place] TOTAL HIP ARTHROPLASTY Right 8/24/2022    Procedure: ARTHROPLASTY HIP TOTAL ANTERIOR;  Surgeon: Rosetta Ricardo DO;  Location: [facility];  Service: Orthopedics    TOTAL ABDOMINAL HYSTERECTOMY  11/2007    with removal of both ovaries     Social History   Social History     Substance and Sexual Activity   Alcohol Use Yes    Comment: Social use     Social History     Substance and Sexual Activity   Drug Use No     Social History     Tobacco Use   Smoking Status Former Smoker   Smokeless Tobacco Never Used     Family History:   Family History   Problem Relation Age of Onset    Other Father         Epilepsy    Other Sister         Cyst, breast; Thyroid cyst    Thyroid disease Sister     Heart failure Maternal Grandfather     Stroke Maternal Grandfather     Diabetes Family     Heart disease Family        Current Outpatient Medications on File Prior to Visit   Medication Sig Dispense Refill    acetaminophen (TYLENOL) 325 mg tablet Take 3 tablets (975 mg total) by mouth every 8 (eight) hours  0    ascorbic acid (VITAMIN C) 500 MG tablet Take 1 tablet (500 mg total) by mouth daily Start to take 30 days before surgery 30 tablet 1    aspirin (ECOTRIN) 325 mg EC tablet Take 1 tablet (325 mg total) by mouth 2 (two) times a day Take with food   84 tablet 0    Biotin 5 MG TBDP Take 2 tablets by mouth      Calcium Ascorbate 500 MG TABS Take 500 mg by mouth      Calcium Carb-Cholecalciferol (CALCIUM 1000 + D) 1000-800 MG-UNIT TABS Take by mouth      [] cephalexin (KEFLEX) 500 mg capsule Take 1 capsule (500 mg total) by mouth every 8 (eight) hours for 7 days 21 capsule 0    Cholecalciferol 1000 units CHEW Chew      Cholecalciferol 1000 units CHEW Chew      Cyanocobalamin (VITAMIN B 12 PO) Take by mouth      docusate sodium (COLACE) 100 mg capsule Take 1 capsule (100 mg total) by mouth 2 (two) times a day 20 capsule 0    ferrous sulfate 324 (65 Fe) mg Take 1 tablet (324 mg total) by mouth daily before breakfast Start to take 30 days before surgery 30 tablet 1    fexofenadine (ALLEGRA) 180 MG tablet Take 180 mg by mouth      fluticasone (FLONASE) 50 mcg/act nasal spray 1 spray into each nostril every 24 hours      folic acid (KP Folic Acid) 1 mg tablet Take 1 tablet (1 mg total) by mouth daily Start to take 30 days before surgery 30 tablet 1    gabapentin (NEURONTIN) 100 mg capsule Take 1 capsule (100 mg total) by mouth every 8 (eight) hours 90 capsule 0    lisinopril (ZESTRIL) 30 mg tablet TAKE 1 TABLET DAILY      loratadine (CLARITIN) 10 mg tablet Take by mouth      LUTEIN PO Take by mouth      Multiple Vitamin (MULTI VITAMIN DAILY PO) Take by mouth      NIFEdipine ER (ADALAT CC) 60 MG 24 hr tablet       Omega-3 Fatty Acids (FISH OIL PO) Take 2 g by mouth      oxyCODONE (ROXICODONE) 5 immediate release tablet Take 1 tablet (5 mg total) by mouth every 4 (four) hours as needed for severe pain Max Daily Amount: 30 mg 42 tablet 0    senna (SENOKOT) 8 6 mg Take 1 tablet (8 6 mg total) by mouth daily at bedtime 20 tablet 0    simvastatin (ZOCOR) 20 mg tablet Take 20 mg by mouth daily at bedtime       No current facility-administered medications on file prior to visit       No Known Allergies     Objective     /71   Pulse 96   Ht 5' 6" (1 676 m)   Wt 93 9 kg (207 lb)   BMI 33 41 kg/m²      PE:  AAOx 3  WDWN  Hearing intact, no drainage from eyes  no audible wheezing  no abdominal distension  LE compartments soft, AT/GS intact    Ortho Exam:  right hip:   INC: persistent small open area at proximal incision, appears improved since last visit, white granulation tissue and no surrounding erythema, no drainage, mild swelling  No pain with ROM

## 2022-09-23 ENCOUNTER — OFFICE VISIT (OUTPATIENT)
Dept: OBGYN CLINIC | Facility: MEDICAL CENTER | Age: 62
End: 2022-09-23

## 2022-09-23 VITALS
BODY MASS INDEX: 31.12 KG/M2 | WEIGHT: 193.6 LBS | DIASTOLIC BLOOD PRESSURE: 71 MMHG | HEART RATE: 89 BPM | SYSTOLIC BLOOD PRESSURE: 106 MMHG | HEIGHT: 66 IN

## 2022-09-23 DIAGNOSIS — Z96.641 STATUS POST TOTAL HIP REPLACEMENT, RIGHT: Primary | ICD-10-CM

## 2022-09-23 PROCEDURE — 99024 POSTOP FOLLOW-UP VISIT: CPT | Performed by: ORTHOPAEDIC SURGERY

## 2022-09-23 NOTE — PROGRESS NOTES
Assessment/Plan:  1  Status post total hip replacement, right      No orders of the defined types were placed in this encounter  · Incision continues to heal  Small open area at proximal incision improving with healthy pink granulation tissue and no drainage or erythema  · Continue PT   · Pain control prn- tylenol  · Patient aware to call ahead for abx prior to dental appts  Return in about 2 weeks (around 10/7/2022) for R BRENNON post op 2  Subjective   Chief Complaint:   Chief Complaint   Patient presents with    Right Hip - Post-op, Follow-up       Sangita Milian is a 63 y o  female who presents for 4 week follow up s/p right anterior BRENNON  She is here for wound check of proximal incision  Patient states incision continues to heal  Notes last bit of prineo dressing pulled off when she showered  She completed keflex and has kept incision covered  Denies drainage, fevers, or chills  She is attending PT  Patient notes she is improving with her pain and mobility  Review of Systems  ROS:    See HPI for musculoskeletal review     All other systems reviewed are negative     History:  Past Medical History:   Diagnosis Date    HTN (hypertension) 8/24/2022    Hyperlipidemia 8/24/2022    Spontaneous miscarriage      Past Surgical History:   Procedure Laterality Date    COLONOSCOPY      FOOT SURGERY      CT TOTAL HIP ARTHROPLASTY Right 8/24/2022    Procedure: ARTHROPLASTY HIP TOTAL ANTERIOR;  Surgeon: Lyudmila Decker DO;  [facility]: [facility];  Service: Orthopedics    TOTAL ABDOMINAL HYSTERECTOMY  11/2007    with removal of both ovaries     Social History   Social History     Substance and Sexual Activity   Alcohol Use Yes    Comment: Social use     Social History     Substance and Sexual Activity   Drug Use No     Social History     Tobacco Use   Smoking Status Former Smoker   Smokeless Tobacco Never Used     Family History:   Family History   Problem Relation Age of Onset    Other Father Epilepsy    Other Sister         Cyst, breast; Thyroid cyst    Thyroid disease Sister     Heart failure Maternal Grandfather     Stroke Maternal Grandfather     Diabetes Family     Heart disease Family        Meds/Allergies   (Not in a hospital admission)    No Known Allergies       Objective     /71   Pulse 89   Ht 5' 6" (1 676 m)   Wt 87 8 kg (193 lb 9 6 oz)   BMI 31 25 kg/m²      PE:  AAOx 3  WDWN  Hearing intact, no drainage from eyes  no audible wheezing  no abdominal distension  LE compartments soft, AT/GS intact    Ortho Exam:  right hip:   INC: healing and improved compared to previous exam, small open area at proximal incision decreased in size with healthy pink granulation tissue and no drainage, No erythema, mild swelling  No pain with ROM

## 2022-10-07 ENCOUNTER — OFFICE VISIT (OUTPATIENT)
Dept: OBGYN CLINIC | Facility: MEDICAL CENTER | Age: 62
End: 2022-10-07

## 2022-10-07 VITALS
WEIGHT: 193.6 LBS | HEIGHT: 66 IN | HEART RATE: 92 BPM | DIASTOLIC BLOOD PRESSURE: 66 MMHG | SYSTOLIC BLOOD PRESSURE: 101 MMHG | BODY MASS INDEX: 31.12 KG/M2

## 2022-10-07 DIAGNOSIS — Z96.641 STATUS POST TOTAL HIP REPLACEMENT, RIGHT: Primary | ICD-10-CM

## 2022-10-07 PROCEDURE — 99024 POSTOP FOLLOW-UP VISIT: CPT | Performed by: ORTHOPAEDIC SURGERY

## 2022-10-07 NOTE — PROGRESS NOTES
Assessment/Plan:  1  Status post total hip replacement, right      No orders of the defined types were placed in this encounter  Incision continues to improve and heal well  No erythema or drainage on examination today  Also, mentioned to the patient that her right knee pain may be stemming from her right hip  We will continue to monitor this and patient was instructed to follow up with surgeon (Dr Leatha Beckwith at [facility]) if pain persists as she had a TKA performed by him in 2020  She understood and all questions were answered  Continue PT/HEP  Pain control prn-Tylenol  Patient aware to call ahead for abx prior to dental appts  Return in about 6 weeks (around 11/18/2022) for Recheck of right hip  I answered all of the patient's questions during the visit and provided education of the patient's condition during the visit  The patient verbalized understanding of the information given and agrees with the plan  This note was dictated using ehealthtracker software  It may contain errors including improperly dictated words  Please contact physician directly for any questions  Subjective   Chief Complaint: No chief complaint on file  Meli Vidal is a 63 y o  female who presents for 6 week follow up s/p right BRENNON, anterior approach performed on 8/24/22  Patient reports that she continues to perform PT 2x per week with benefit  She states that she continues to have an intermittent "dull, aching" sensation which she understands is normal for this time post oepratively  Patient also mentioned mild, intermittent right knee pain today  Pain is worse after physical therapy and with prolonged weight bearing  She has a previous history of a Right TKA performed by Dr Leatha Beckwith at [facility] in 2020  No fevers or chills  Review of Systems  ROS:    See HPI for musculoskeletal review     All other systems reviewed are negative     History:  Past Medical History:   Diagnosis Date    HTN (hypertension) 8/24/2022    Hyperlipidemia 8/24/2022    Spontaneous miscarriage      Past Surgical History:   Procedure Laterality Date    COLONOSCOPY      FOOT SURGERY      [place] TOTAL HIP ARTHROPLASTY Right 8/24/2022    Procedure: ARTHROPLASTY HIP TOTAL ANTERIOR;  Surgeon: Jesus Pruitt DO;  [facility];  Service: Orthopedics    TOTAL ABDOMINAL HYSTERECTOMY  11/2007    with removal of both ovaries     Social History   Social History     Substance and Sexual Activity   Alcohol Use Yes    Comment: Social use     Social History     Substance and Sexual Activity   Drug Use No     Social History     Tobacco Use   Smoking Status Former Smoker   Smokeless Tobacco Never Used     Family History:   Family History   Problem Relation Age of Onset    Other Father         Epilepsy    Other Sister         Cyst, breast; Thyroid cyst    Thyroid disease Sister     Heart failure Maternal Grandfather     Stroke Maternal Grandfather     Diabetes Family     Heart disease Family        Meds/Allergies   (Not in a hospital admission)    No Known Allergies       Objective     /66   Pulse 92   Ht 5' 6" (1 676 m)   Wt 87 8 kg (193 lb 9 6 oz)   BMI 31 25 kg/m²      PE:  AAOx 3  WDWN  Hearing intact, no drainage from eyes  no audible wheezing  no abdominal distension  LE compartments soft, AT/GS intact    Ortho Exam:  right hip:   INC: continued healing and improved compared to previous exam, No erythema or drainage, mild swelling  No pain with ROM  Leg lengths are equal      Scribe Attestation    I,:  Nicho Guzman am acting as a scribe while in the presence of the attending physician :       I,:  Jesus Pruitt DO personally performed the services described in this documentation    as scribed in my presence :

## 2022-10-11 ENCOUNTER — TELEPHONE (OUTPATIENT)
Dept: OBGYN CLINIC | Facility: CLINIC | Age: 62
End: 2022-10-11

## 2022-10-11 DIAGNOSIS — Z96.641 STATUS POST TOTAL HIP REPLACEMENT, RIGHT: Primary | ICD-10-CM

## 2022-10-11 NOTE — TELEPHONE ENCOUNTER
New PT script placed for patient to continue PT with [facility]  Fax and callback numbers provided to MA to send documentation to Meli Denton with [facility]

## 2022-10-11 NOTE — TELEPHONE ENCOUNTER
Caller: Martell Fink from [facility]    Doctor: Ruth Rahman    Reason for call: In need of new PT script for right hip       Fax#: [phone]    Call back#: [phone]

## 2022-10-11 NOTE — TELEPHONE ENCOUNTER
Johann Choudhary, would you be able to fax this patient's most recent PT order to [facility]? Fax#: [phone]  Call back#: [phone]   Thank you!

## 2022-11-09 ENCOUNTER — TELEPHONE (OUTPATIENT)
Dept: OBGYN CLINIC | Facility: HOSPITAL | Age: 62
End: 2022-11-09

## 2022-11-09 NOTE — TELEPHONE ENCOUNTER
Caller: patient     Doctor: Arya Devlin    Reason for call: patient needs a LYFT for her 11/11 1:30pm appt in [facility] [place]    Call back#: [phone]

## 2022-11-11 ENCOUNTER — OFFICE VISIT (OUTPATIENT)
Dept: OBGYN CLINIC | Facility: MEDICAL CENTER | Age: 62
End: 2022-11-11

## 2022-11-11 VITALS
HEIGHT: 66 IN | SYSTOLIC BLOOD PRESSURE: 133 MMHG | HEART RATE: 80 BPM | BODY MASS INDEX: 30.7 KG/M2 | WEIGHT: 191 LBS | DIASTOLIC BLOOD PRESSURE: 92 MMHG

## 2022-11-11 DIAGNOSIS — Z96.641 STATUS POST TOTAL HIP REPLACEMENT, RIGHT: Primary | ICD-10-CM

## 2022-11-11 DIAGNOSIS — G89.29 CHRONIC RIGHT-SIDED LOW BACK PAIN WITHOUT SCIATICA: ICD-10-CM

## 2022-11-11 DIAGNOSIS — M54.50 CHRONIC RIGHT-SIDED LOW BACK PAIN WITHOUT SCIATICA: ICD-10-CM

## 2022-11-11 RX ORDER — SIMVASTATIN 40 MG
40 TABLET ORAL EVERY EVENING
COMMUNITY
Start: 2022-11-09

## 2022-11-11 RX ORDER — HYDROCHLOROTHIAZIDE 25 MG/1
25 TABLET ORAL DAILY
COMMUNITY
Start: 2022-10-17

## 2022-11-11 NOTE — PROGRESS NOTES
Assessment/Plan:  1  Status post total hip replacement, right    2  Chronic right-sided low back pain without sciatica      Orders Placed This Encounter   Procedures   • Ambulatory Referral to Pain Management       · Patient is doing well 11 weeks status post right anterior BRENNON on 8/24/22  · Incision is completely healed  She may start scar massage over the area  · Continue PT then transition to home exercises  A new PT script was faxed to her office today  · Pain control prn- OTC pain meds  · Patient should call ahead for abx prior to dental appts  · She will continue to monitor right knee pain  If no improvement she will schedule follow up appointment with Dr Elva Gómez who completed R TKA in 2020  · Referral provided for eval of low back pain with Pain Management  Return in about 9 months (around 8/11/2023) for R BRENNON year post op  I answered all of the patient's questions during the visit and provided education of the patient's condition during the visit  The patient verbalized understanding of the information given and agrees with the plan  This note was dictated using [employer] software  It may contain errors including improperly dictated words  Please contact physician directly for any questions  Subjective   Chief Complaint:   Chief Complaint   Patient presents with   • Right Hip - Post-op       Kvng Gutierrez is a 63 y o  female who presents for 11 week follow up s/p right anterior BRENNON  Patient states she is doing well  She notes some pulling sensation in the anterior thigh near her incision  She is experiencing right knee and right side low back soreness as well  Patient states her right sided low back pain is worse with prolonged standing  She believes she has seen a chiropractor before and has had injections into her back  She has not seen anyone recently  She had R TKA with Dr Elva Gómez in 2020  She is taking tylenol as needed for pain   She is attending outpatient PT and notes improvement  Denies leg length discrepancy  Overall she is happy with her total hip  Review of Systems  ROS:    See HPI for musculoskeletal review     All other systems reviewed are negative     History:  Past Medical History:   Diagnosis Date   • HTN (hypertension) 8/24/2022   • Hyperlipidemia 8/24/2022   • Spontaneous miscarriage      Past Surgical History:   Procedure Laterality Date   • COLONOSCOPY     • FOOT SURGERY     • CO TOTAL HIP ARTHROPLASTY Right 8/24/2022    Procedure: ARTHROPLASTY HIP TOTAL ANTERIOR;  Surgeon: Ramon Kumar DO;  Location: [facility];  Service: Orthopedics   • TOTAL ABDOMINAL HYSTERECTOMY  11/2007    with removal of both ovaries     Social History   Social History     Substance and Sexual Activity   Alcohol Use Yes    Comment: Social use     Social History     Substance and Sexual Activity   Drug Use No     Social History     Tobacco Use   Smoking Status Former Smoker   Smokeless Tobacco Never Used     Family History:   Family History   Problem Relation Age of Onset   • Other Father         Epilepsy   • Other Sister         Cyst, breast; Thyroid cyst   • Thyroid disease Sister    • Heart failure Maternal Grandfather    • Stroke Maternal Grandfather    • Diabetes Family    • Heart disease Family        Current Outpatient Medications on File Prior to Visit   Medication Sig Dispense Refill   • acetaminophen (TYLENOL) 325 mg tablet Take 3 tablets (975 mg total) by mouth every 8 (eight) hours  0   • ascorbic acid (VITAMIN C) 500 MG tablet Take 1 tablet (500 mg total) by mouth daily Start to take 30 days before surgery 30 tablet 1   • Calcium Ascorbate 500 MG TABS Take 500 mg by mouth     • Cholecalciferol 1000 units CHEW Chew     • Cyanocobalamin (VITAMIN B 12 PO) Take by mouth     • fexofenadine (ALLEGRA) 180 MG tablet Take 180 mg by mouth     • hydrochlorothiazide (HYDRODIURIL) 25 mg tablet Take 25 mg by mouth daily     • lisinopril (ZESTRIL) 40 mg tablet 40 mg     • loratadine (CLARITIN) 10 mg tablet Take by mouth     • Multiple Vitamin (MULTI VITAMIN DAILY PO) Take by mouth     • NIFEdipine ER (ADALAT CC) 60 MG 24 hr tablet      • senna (SENOKOT) 8 6 mg Take 1 tablet (8 6 mg total) by mouth daily at bedtime 20 tablet 0   • simvastatin (ZOCOR) 40 mg tablet Take 40 mg by mouth every evening     • aspirin (ECOTRIN) 325 mg EC tablet Take 1 tablet (325 mg total) by mouth 2 (two) times a day Take with food   (Patient not taking: Reported on 11/11/2022) 84 tablet 0   • Biotin 5 MG TBDP Take 2 tablets by mouth (Patient not taking: Reported on 11/11/2022)     • Calcium Carb-Cholecalciferol 1000-800 MG-UNIT TABS Take by mouth (Patient not taking: Reported on 11/11/2022)     • Cholecalciferol 1000 units CHEW Chew (Patient not taking: Reported on 11/11/2022)     • docusate sodium (COLACE) 100 mg capsule Take 1 capsule (100 mg total) by mouth 2 (two) times a day (Patient not taking: Reported on 11/11/2022) 20 capsule 0   • ferrous sulfate 324 (65 Fe) mg Take 1 tablet (324 mg total) by mouth daily before breakfast Start to take 30 days before surgery (Patient not taking: Reported on 11/11/2022) 30 tablet 1   • fluticasone (FLONASE) 50 mcg/act nasal spray 1 spray into each nostril every 24 hours (Patient not taking: Reported on 95/88/8479)     • folic acid (KP Folic Acid) 1 mg tablet Take 1 tablet (1 mg total) by mouth daily Start to take 30 days before surgery (Patient not taking: Reported on 11/11/2022) 30 tablet 1   • gabapentin (NEURONTIN) 100 mg capsule Take 1 capsule (100 mg total) by mouth every 8 (eight) hours (Patient not taking: Reported on 11/11/2022) 90 capsule 0   • LUTEIN PO Take by mouth (Patient not taking: Reported on 11/11/2022)     • Omega-3 Fatty Acids (FISH OIL PO) Take 2 g by mouth (Patient not taking: Reported on 11/11/2022)     • oxyCODONE (ROXICODONE) 5 immediate release tablet Take 1 tablet (5 mg total) by mouth every 4 (four) hours as needed for severe pain Max Daily Amount: 30 mg (Patient not taking: Reported on 11/11/2022) 42 tablet 0   • simvastatin (ZOCOR) 20 mg tablet Take 20 mg by mouth daily at bedtime (Patient not taking: Reported on 11/11/2022)       No current facility-administered medications on file prior to visit       No Known Allergies     Objective     /92   Pulse 80   Ht 5' 6" (1 676 m)   Wt 86 6 kg (191 lb)   BMI 30 83 kg/m²      PE:  AAOx 3  WDWN  Hearing intact, no drainage from eyes  no audible wheezing  no abdominal distension  LE compartments soft, AT/GS intact    Ortho Exam:  right hip:   INC: healed, No erythema  No pain with ROM

## 2022-12-09 ENCOUNTER — TELEPHONE (OUTPATIENT)
Dept: OBGYN CLINIC | Facility: HOSPITAL | Age: 62
End: 2022-12-09

## 2022-12-09 DIAGNOSIS — Z96.641 STATUS POST TOTAL HIP REPLACEMENT, RIGHT: Primary | ICD-10-CM

## 2022-12-09 NOTE — TELEPHONE ENCOUNTER
Caller: patient    Doctor: Emiliano Ceballos    Reason for call: patient called stating she will need a new order for PT    She use a [facility]    Call back#: [phone] Patient asking to establish with Gloria Mckeon NP. Seen her once in 2016. Seeking a provider that is female and will also see her daughter (Isa Brewer -age 17). No current concerns but has medications that will need refilling in the future.      Note also sent on patient's daughter to establish.

## 2022-12-12 ENCOUNTER — TELEPHONE (OUTPATIENT)
Dept: OBGYN CLINIC | Facility: HOSPITAL | Age: 62
End: 2022-12-12

## 2022-12-12 NOTE — TELEPHONE ENCOUNTER
Caller: Aisha Valente    Doctor: Rachel Craft    Reason for call: Please fax PT orders to [phone]    Call back#: N/A

## 2022-12-28 ENCOUNTER — TELEPHONE (OUTPATIENT)
Dept: OBGYN CLINIC | Facility: HOSPITAL | Age: 62
End: 2022-12-28

## 2022-12-28 NOTE — TELEPHONE ENCOUNTER
Caller: Ara Thibodeaux    Doctor: Melissa Diaz    Reason for call: Patient is wondering if you can renew her handicap sticker       Call back#: [phone]

## 2022-12-29 NOTE — TELEPHONE ENCOUNTER
As long as you deem it medically necessary, Dr Shahzad Barker  Patient is 18 weeks out from anterior RTHA

## 2022-12-30 NOTE — TELEPHONE ENCOUNTER
Provide the patient with a extension of her handicap sticker  Please let her know that this will be the last extension that she receives as Dr Trini Otto feels she should be able to ambulate after that

## 2023-01-05 NOTE — TELEPHONE ENCOUNTER
Caller: Patient    Doctor: Leigh nicole    Reason for call: Patient requested status of parking sticker   Relayed information from [facility] 1/3/23 message    Call back#: [phone]

## 2023-01-09 ENCOUNTER — CONSULT (OUTPATIENT)
Dept: PAIN MEDICINE | Facility: CLINIC | Age: 63
End: 2023-01-09

## 2023-01-09 ENCOUNTER — TELEPHONE (OUTPATIENT)
Dept: OBGYN CLINIC | Facility: MEDICAL CENTER | Age: 63
End: 2023-01-09

## 2023-01-09 VITALS
SYSTOLIC BLOOD PRESSURE: 141 MMHG | HEART RATE: 95 BPM | BODY MASS INDEX: 31.4 KG/M2 | HEIGHT: 66 IN | DIASTOLIC BLOOD PRESSURE: 94 MMHG | WEIGHT: 195.4 LBS

## 2023-01-09 DIAGNOSIS — G89.29 CHRONIC BILATERAL LOW BACK PAIN, UNSPECIFIED WHETHER SCIATICA PRESENT: Primary | ICD-10-CM

## 2023-01-09 DIAGNOSIS — M54.50 CHRONIC BILATERAL LOW BACK PAIN, UNSPECIFIED WHETHER SCIATICA PRESENT: Primary | ICD-10-CM

## 2023-01-09 DIAGNOSIS — M79.18 MYOFASCIAL PAIN: ICD-10-CM

## 2023-01-09 DIAGNOSIS — M47.816 LUMBAR SPONDYLOSIS: ICD-10-CM

## 2023-01-09 RX ORDER — METHOCARBAMOL 500 MG/1
500 TABLET, FILM COATED ORAL 2 TIMES DAILY PRN
Qty: 60 TABLET | Refills: 1 | Status: SHIPPED | OUTPATIENT
Start: 2023-01-09

## 2023-01-09 NOTE — TELEPHONE ENCOUNTER
Caller: [facility]    Doctor: Dr Luna Mckinley     Reason for call: April Gao is calling to obtain a new PT script for the patient  The one on   today  Please fax to number below  Thank you       FAX : [phone]    Call back #   [phone]

## 2023-01-09 NOTE — PROGRESS NOTES
Assessment  1  Chronic bilateral low back pain, unspecified whether sciatica present    2  Lumbar spondylosis    3  Myofascial pain        Plan  57-year-old female status post right BRENNON August 24, 2022, referred by orthopedics, presenting for initial consultation regarding a 3-year history of lumbosacral back pain without any radicular symptoms into her lower extremities  She denies any trauma or inciting event  The patient does have some numbness on the lateral right hip since her right hip replacement  X-ray of the right hip shows stable arthroplasty  Last x-ray of the lumbar spine was from 2020 demonstrating multilevel anterior and lateral osteophytes  She is currently engaged in physical therapy for rehabilitation after her BRENNON, however has not done any physical therapy for her lumbar complaints  The patient has had trigger point injections in the past without any relief  She does take Tylenol as needed with some relief  She was advised by her PCP to avoid NSAIDs secondary to hypertension  Patient's low back pain seems to be multifactorial including myofascial and facet mediated components  No evidence of sacroiliitis  Discogenic pathology also on the differential although lower considering lack of radicular symptoms  1   I will order physical therapy for the patient's lumbar complaints  2  I will order an updated x-ray of the lumbar spine  3  I will prescribe a trial of methocarbamol 500 mg twice daily as needed for her myofascial pain  4  Patient may continue with Tylenol as needed and should not exceed more than 3000 mg in 24 hours  5  I will follow-up with the patient in 6 weeks and if she does not respond to conservative treatment we will order an MRI of the lumbar spine without contrast      My impressions and treatment recommendations were discussed in detail with the patient who verbalized understanding and had no further questions  Discharge instructions were provided   I personally saw and examined the patient and I agree with the above discussed plan of care  No orders of the defined types were placed in this encounter  No orders of the defined types were placed in this encounter  History of Present Illness    Emigdio Madrigal is a 63 y o  female status post right BRENNON August 24, 2022, referred by orthopedics, presenting for initial consultation regarding a 3-year history of lumbosacral back pain without any radicular symptoms into her lower extremities  She denies any trauma or inciting event  The patient does have some numbness on the lateral right hip since her right hip replacement  She denies any bladder or bowel incontinence or saddle anesthesia  X-ray of the right hip shows stable arthroplasty  Last x-ray of the lumbar spine was from 2020 demonstrating multilevel anterior and lateral osteophytes  She is currently engaged in physical therapy for rehabilitation after her BRENNON, however has not done any physical therapy for her lumbar complaints  The patient has had trigger point injections in the past without any relief  She does take Tylenol as needed with some relief  She was advised by her PCP to avoid NSAIDs secondary to hypertension  The patient rates her pain 8 out of 10 and the pain is intermittent  The pain is worse in the afternoon and evening  The pain is described as pressure-like  The pain is worse with standing, bending, and walking  The pain is alleviated with exercise, coughing, and sneezing  Other than as stated above, the patient denies any interval changes in medications, medical condition, mental condition, symptoms, or allergies since the last office visit  I have personally reviewed and/or updated the patient's past medical history, past surgical history, family history, social history, current medications, allergies, and vital signs today       Review of Systems    Patient Active Problem List   Diagnosis   • HTN (hypertension)   • Hyperlipidemia   • Status post total hip replacement, right   • ABLA (acute blood loss anemia)       Past Medical History:   Diagnosis Date   • HTN (hypertension) 8/24/2022   • Hyperlipidemia 8/24/2022   • Spontaneous miscarriage        Past Surgical History:   Procedure Laterality Date   • COLONOSCOPY     • FOOT SURGERY     • DC ARTHRP ACETBLR/PROX FEM PROSTC AGRFT/ALGRFT Right 8/24/2022    Procedure: ARTHROPLASTY HIP TOTAL ANTERIOR;  Surgeon: Yohannes Orellana DO;  Location: [facility];  Service: Orthopedics   • TOTAL ABDOMINAL HYSTERECTOMY  11/2007    with removal of both ovaries       Family History   Problem Relation Age of Onset   • Other Father         Epilepsy   • Other Sister         Cyst, breast; Thyroid cyst   • Thyroid disease Sister    • Heart failure Maternal Grandfather    • Stroke Maternal Grandfather    • Diabetes Family    • Heart disease Family        Social History     Occupational History   • Not on file   Tobacco Use   • Smoking status: Former   • Smokeless tobacco: Never   Vaping Use   • Vaping Use: Never used   Substance and Sexual Activity   • Alcohol use: Yes     Comment: Social use   • Drug use: No   • Sexual activity: Not Currently       Current Outpatient Medications on File Prior to Visit   Medication Sig   • acetaminophen (TYLENOL) 325 mg tablet Take 3 tablets (975 mg total) by mouth every 8 (eight) hours   • ascorbic acid (VITAMIN C) 500 MG tablet Take 1 tablet (500 mg total) by mouth daily Start to take 30 days before surgery   • Calcium Ascorbate 500 MG TABS Take 500 mg by mouth   • Cholecalciferol 1000 units CHEW Chew   • Cyanocobalamin (VITAMIN B 12 PO) Take by mouth   • fexofenadine (ALLEGRA) 180 MG tablet Take 180 mg by mouth   • hydrochlorothiazide (HYDRODIURIL) 25 mg tablet Take 25 mg by mouth daily   • lisinopril (ZESTRIL) 40 mg tablet 40 mg   • loratadine (CLARITIN) 10 mg tablet Take by mouth   • Multiple Vitamin (MULTI VITAMIN DAILY PO) Take by mouth   • NIFEdipine ER (ADALAT CC) 60 MG 24 hr tablet    • senna (SENOKOT) 8 6 mg Take 1 tablet (8 6 mg total) by mouth daily at bedtime   • simvastatin (ZOCOR) 40 mg tablet Take 40 mg by mouth every evening   • aspirin (ECOTRIN) 325 mg EC tablet Take 1 tablet (325 mg total) by mouth 2 (two) times a day Take with food   (Patient not taking: Reported on 11/11/2022)   • Biotin 5 MG TBDP Take 2 tablets by mouth (Patient not taking: Reported on 11/11/2022)   • Calcium Carb-Cholecalciferol 1000-800 MG-UNIT TABS Take by mouth (Patient not taking: Reported on 11/11/2022)   • Cholecalciferol 1000 units CHEW Chew (Patient not taking: Reported on 11/11/2022)   • docusate sodium (COLACE) 100 mg capsule Take 1 capsule (100 mg total) by mouth 2 (two) times a day (Patient not taking: Reported on 11/11/2022)   • ferrous sulfate 324 (65 Fe) mg Take 1 tablet (324 mg total) by mouth daily before breakfast Start to take 30 days before surgery (Patient not taking: Reported on 11/11/2022)   • fluticasone (FLONASE) 50 mcg/act nasal spray 1 spray into each nostril every 24 hours (Patient not taking: Reported on 21/42/2355)   • folic acid (KP Folic Acid) 1 mg tablet Take 1 tablet (1 mg total) by mouth daily Start to take 30 days before surgery (Patient not taking: Reported on 11/11/2022)   • gabapentin (NEURONTIN) 100 mg capsule Take 1 capsule (100 mg total) by mouth every 8 (eight) hours (Patient not taking: Reported on 11/11/2022)   • LUTEIN PO Take by mouth (Patient not taking: Reported on 11/11/2022)   • Omega-3 Fatty Acids (FISH OIL PO) Take 2 g by mouth (Patient not taking: Reported on 11/11/2022)   • oxyCODONE (ROXICODONE) 5 immediate release tablet Take 1 tablet (5 mg total) by mouth every 4 (four) hours as needed for severe pain Max Daily Amount: 30 mg (Patient not taking: Reported on 11/11/2022)   • simvastatin (ZOCOR) 20 mg tablet Take 20 mg by mouth daily at bedtime (Patient not taking: Reported on 11/11/2022)     No current facility-administered medications on file prior to visit  No Known Allergies    Physical Exam    /94   Pulse 95   Ht 5' 6" (1 676 m)   Wt 88 6 kg (195 lb 6 4 oz)   BMI 31 54 kg/m²     Constitutional: normal, well developed, well nourished, alert, in no distress and non-toxic and no overt pain behavior  Eyes: anicteric  HEENT: grossly intact  Neck: supple, symmetric, trachea midline and no masses   Pulmonary:even and unlabored  Cardiovascular:No edema or pitting edema present  Skin:Normal without rashes or lesions and well hydrated  Psychiatric:Mood and affect appropriate  Neurologic:Cranial Nerves II-XII grossly intact  Musculoskeletal:normal gait  Bilateral lumbar paraspinals tender to palpation from L3-L5  Bilateral SI joints nontender to palpation  Bilateral trochanteric flares nontender to palpation  Bilateral patellar and Achilles reflexes were 2/4 and symmetrical   No clonus was noted bilaterally  Bilateral lower extremity strength was 5/5 in all muscle groups  Sensation intact to light touch in L3 thru S1 dermatomes bilaterally  Negative straight leg raise bilaterally  Negative Finn's and Gaenslen's test bilaterally  Imaging  History: Back pain     Comparison: None     Results:     5 views of the lumbar spine show mild degenerative changes of the lower thoracic   and the lumbar spine with multilevel anterior and lateral osteophytes  5   nonrib-bearing lumbar vertebra with no evidence of acute fracture or   spondylolisthesis  No pars defects are seen  The intervertebral disc spaces and   neural foramina are patent  The paravertebral soft tissues are intact  Procedure Note    Maxim Foy MD - 11/19/2020   Formatting of this note might be different from the original    History: Back pain     Comparison: None     Results:     5 views of the lumbar spine show mild degenerative changes of the lower thoracic   and the lumbar spine with multilevel anterior and lateral osteophytes   5   nonrib-bearing lumbar vertebra with no evidence of acute fracture or   spondylolisthesis  No pars defects are seen  The intervertebral disc spaces and   neural foramina are patent  The paravertebral soft tissues are intact  IMPRESSION:   Impression:     Degenerative changes with no evidence of acute fracture

## 2023-01-10 ENCOUNTER — HOSPITAL ENCOUNTER (OUTPATIENT)
Dept: RADIOLOGY | Facility: HOSPITAL | Age: 63
Discharge: HOME | End: 2023-01-10
Attending: ANESTHESIOLOGY

## 2023-01-10 DIAGNOSIS — M47.816 LUMBAR SPONDYLOSIS: ICD-10-CM

## 2023-01-10 DIAGNOSIS — G89.29 CHRONIC BILATERAL LOW BACK PAIN, UNSPECIFIED WHETHER SCIATICA PRESENT: ICD-10-CM

## 2023-01-10 DIAGNOSIS — Z96.641 STATUS POST TOTAL HIP REPLACEMENT, RIGHT: Primary | ICD-10-CM

## 2023-01-10 DIAGNOSIS — M54.50 CHRONIC BILATERAL LOW BACK PAIN, UNSPECIFIED WHETHER SCIATICA PRESENT: ICD-10-CM

## 2023-01-10 NOTE — TELEPHONE ENCOUNTER
New PT script placed for patient to continue PT with Andra  Fax and callback numbers provided to MA to send documentation to [facility] with Andra

## 2023-01-10 NOTE — TELEPHONE ENCOUNTER
Becca Duarte, would you be able to fax this patient's most recent PT order to [facility] again? Fax#: [phone]  Call back#: [phone]   Thank you!

## 2023-02-13 ENCOUNTER — TELEPHONE (OUTPATIENT)
Dept: PAIN MEDICINE | Facility: CLINIC | Age: 63
End: 2023-02-13

## 2023-02-13 DIAGNOSIS — M54.50 CHRONIC BILATERAL LOW BACK PAIN, UNSPECIFIED WHETHER SCIATICA PRESENT: Primary | ICD-10-CM

## 2023-02-13 DIAGNOSIS — G89.29 CHRONIC BILATERAL LOW BACK PAIN, UNSPECIFIED WHETHER SCIATICA PRESENT: Primary | ICD-10-CM

## 2023-02-13 NOTE — TELEPHONE ENCOUNTER
Caller: Tha Lorenzana    Doctor: Laureano Alfaro    Reason for call: pls update PT script for Chronic bilateral low back pain and fax to [facility]     Call back#: [phone]/ fx [phone]

## 2023-02-22 NOTE — PROGRESS NOTES
Assessment:  1  Chronic bilateral low back pain, unspecified whether sciatica present    2  Lumbar spondylosis    3  Myofascial pain        Plan:  1  Patient has completed at least 6 weeks of physical therapy with [facility] with ongoing pain of her low back  I will order an MRI lumbar spine without contrast for further evaluation of her symptoms call the patient with treatment options based off of the results  2  Patient may continue methocarbamol as prescribed  She does not require refills today  3  Patient may continue Tylenol as needed and should not exceed more than 3000 mg in 24 hours  4  We will avoid NSAIDs secondary to CKD  5  Follow-up after MRI or sooner if needed    History of Present Illness: The patient is a 66 y o  female status post bilateral BRENNON most recent right BRENNON in August 2022 last seen on 01/09/2023 who presents for a follow up office visit in regards to chronic axial low back pain that is nonradiating into the lower extremities  She denies bowel or bladder incontinence or saddle anesthesia  Patient has been participating in therapy through [facility] for the last 6 weeks with mild improvement of her low back pain  She uses Tylenol as needed with mild improvement  She has not had relief in the past with trigger point injections  She avoids NSAIDs secondary to history of hypertension  She rates her pain a 5 out of 10 on the numeric pain rating scale  She intermittently has pain in the evening which is described as pressure-like    I have personally reviewed and/or updated the patient's past medical history, past surgical history, family history, social history, current medications, allergies, and vital signs today  Review of Systems:    Review of Systems   Respiratory: Negative for shortness of breath  Cardiovascular: Negative for chest pain  Gastrointestinal: Negative for constipation, diarrhea, nausea and vomiting     Musculoskeletal: Negative for arthralgias, gait problem, joint swelling and myalgias  Skin: Negative for rash  Neurological: Negative for dizziness, seizures and weakness  All other systems reviewed and are negative          Past Medical History:   Diagnosis Date   • HTN (hypertension) 8/24/2022   • Hyperlipidemia 8/24/2022   • Spontaneous miscarriage        Past Surgical History:   Procedure Laterality Date   • COLONOSCOPY     • FOOT SURGERY     • HI ARTHRP ACETBLR/PROX FEM PROSTC AGRFT/ALGRFT Right 8/24/2022    Procedure: ARTHROPLASTY HIP TOTAL ANTERIOR;  Surgeon: Mars Oleary DO;  Location: [facility];  Service: Orthopedics   • TOTAL ABDOMINAL HYSTERECTOMY  11/2007    with removal of both ovaries       Family History   Problem Relation Age of Onset   • Other Father         Epilepsy   • Other Sister         Cyst, breast; Thyroid cyst   • Thyroid disease Sister    • Heart failure Maternal Grandfather    • Stroke Maternal Grandfather    • Diabetes Family    • Heart disease Family        Social History     Occupational History   • Not on file   Tobacco Use   • Smoking status: Former   • Smokeless tobacco: Never   Vaping Use   • Vaping Use: Never used   Substance and Sexual Activity   • Alcohol use: Yes     Comment: Social use   • Drug use: No   • Sexual activity: Not Currently         Current Outpatient Medications:   •  acetaminophen (TYLENOL) 325 mg tablet, Take 3 tablets (975 mg total) by mouth every 8 (eight) hours, Disp: , Rfl: 0  •  ascorbic acid (VITAMIN C) 500 MG tablet, Take 1 tablet (500 mg total) by mouth daily Start to take 30 days before surgery, Disp: 30 tablet, Rfl: 1  •  Calcium Ascorbate 500 MG TABS, Take 500 mg by mouth, Disp: , Rfl:   •  Cholecalciferol 1000 units CHEW, Chew, Disp: , Rfl:   •  Cyanocobalamin (VITAMIN B 12 PO), Take by mouth, Disp: , Rfl:   •  fexofenadine (ALLEGRA) 180 MG tablet, Take 180 mg by mouth, Disp: , Rfl:   •  hydrochlorothiazide (HYDRODIURIL) 25 mg tablet, Take 25 mg by mouth daily, Disp: , Rfl:   • lisinopril (ZESTRIL) 40 mg tablet, 40 mg, Disp: , Rfl:   •  loratadine (CLARITIN) 10 mg tablet, Take by mouth, Disp: , Rfl:   •  methocarbamol (ROBAXIN) 500 mg tablet, Take 1 tablet (500 mg total) by mouth 2 (two) times a day as needed for muscle spasms, Disp: 60 tablet, Rfl: 1  •  Multiple Vitamin (MULTI VITAMIN DAILY PO), Take by mouth, Disp: , Rfl:   •  NIFEdipine ER (ADALAT CC) 60 MG 24 hr tablet, , Disp: , Rfl:   •  senna (SENOKOT) 8 6 mg, Take 1 tablet (8 6 mg total) by mouth daily at bedtime, Disp: 20 tablet, Rfl: 0  •  simvastatin (ZOCOR) 40 mg tablet, Take 40 mg by mouth every evening, Disp: , Rfl:   •  aspirin (ECOTRIN) 325 mg EC tablet, Take 1 tablet (325 mg total) by mouth 2 (two) times a day Take with food   (Patient not taking: Reported on 11/11/2022), Disp: 84 tablet, Rfl: 0  •  Biotin 5 MG TBDP, Take 2 tablets by mouth (Patient not taking: Reported on 11/11/2022), Disp: , Rfl:   •  Calcium Carb-Cholecalciferol 1000-800 MG-UNIT TABS, Take by mouth (Patient not taking: Reported on 11/11/2022), Disp: , Rfl:   •  Cholecalciferol 1000 units CHEW, Chew (Patient not taking: Reported on 11/11/2022), Disp: , Rfl:   •  docusate sodium (COLACE) 100 mg capsule, Take 1 capsule (100 mg total) by mouth 2 (two) times a day (Patient not taking: Reported on 11/11/2022), Disp: 20 capsule, Rfl: 0  •  ferrous sulfate 324 (65 Fe) mg, Take 1 tablet (324 mg total) by mouth daily before breakfast Start to take 30 days before surgery (Patient not taking: Reported on 11/11/2022), Disp: 30 tablet, Rfl: 1  •  fluticasone (FLONASE) 50 mcg/act nasal spray, 1 spray into each nostril every 24 hours (Patient not taking: Reported on 11/11/2022), Disp: , Rfl:   •  folic acid (KP Folic Acid) 1 mg tablet, Take 1 tablet (1 mg total) by mouth daily Start to take 30 days before surgery (Patient not taking: Reported on 11/11/2022), Disp: 30 tablet, Rfl: 1  •  gabapentin (NEURONTIN) 100 mg capsule, Take 1 capsule (100 mg total) by mouth every 8 (eight) hours (Patient not taking: Reported on 11/11/2022), Disp: 90 capsule, Rfl: 0  •  LUTEIN PO, Take by mouth (Patient not taking: Reported on 11/11/2022), Disp: , Rfl:   •  Omega-3 Fatty Acids (FISH OIL PO), Take 2 g by mouth (Patient not taking: Reported on 11/11/2022), Disp: , Rfl:   •  oxyCODONE (ROXICODONE) 5 immediate release tablet, Take 1 tablet (5 mg total) by mouth every 4 (four) hours as needed for severe pain Max Daily Amount: 30 mg (Patient not taking: Reported on 11/11/2022), Disp: 42 tablet, Rfl: 0  •  simvastatin (ZOCOR) 20 mg tablet, Take 20 mg by mouth daily at bedtime (Patient not taking: Reported on 11/11/2022), Disp: , Rfl:     No Known Allergies    Physical Exam:    /96   Ht 5' 6" (1 676 m)   Wt 90 3 kg (199 lb)   BMI 32 12 kg/m²     Constitutional:normal, well developed, well nourished, alert, in no distress and non-toxic and no overt pain behavior  Eyes:anicteric  HEENT:grossly intact  Neck:supple, symmetric, trachea midline and no masses   Pulmonary:even and unlabored  Cardiovascular:No edema or pitting edema present  Skin:Normal without rashes or lesions and well hydrated  Psychiatric:Mood and affect appropriate  Neurologic:Cranial Nerves II-XII grossly intact  Musculoskeletal:normal gait      Imaging  MRI lumbar spine without contrast    (Results Pending)       LUMBAR SPINE   INDICATION: M54 50: Low back pain, unspecified   G89 29: Other chronic pain   M47 816: Spondylosis without myelopathy or radiculopathy, lumbar region  COMPARISON: None   VIEWS: XR SPINE LUMBAR MINIMUM 4 VIEWS NON INJURY   FINDINGS:   There are 5 non rib bearing lumbar vertebral bodies  There is no evidence of acute fracture or destructive osseous lesion  Grade 1 degenerative anterolisthesis of L4 on L5  Small osteophytes at all lumbar levels  Mild disc height loss at L4-5 and L5-S1  Multilevel facet degenerative changes most prominent at L4-5 and L5-S1  The pedicles appear intact  Atherosclerotic vascular disease is present  Partially visualized bilateral hip prosthesis  IMPRESSION:   No acute osseous abnormality  Degenerative changes as described     Workstation performed: [id number]     Orders Placed This Encounter   Procedures   • MRI lumbar spine without contrast

## 2023-02-23 ENCOUNTER — OFFICE VISIT (OUTPATIENT)
Dept: PAIN MEDICINE | Facility: CLINIC | Age: 63
End: 2023-02-23

## 2023-02-23 VITALS
SYSTOLIC BLOOD PRESSURE: 158 MMHG | BODY MASS INDEX: 31.98 KG/M2 | HEIGHT: 66 IN | WEIGHT: 199 LBS | DIASTOLIC BLOOD PRESSURE: 96 MMHG

## 2023-02-23 DIAGNOSIS — M47.816 LUMBAR SPONDYLOSIS: ICD-10-CM

## 2023-02-23 DIAGNOSIS — M54.50 CHRONIC BILATERAL LOW BACK PAIN, UNSPECIFIED WHETHER SCIATICA PRESENT: Primary | ICD-10-CM

## 2023-02-23 DIAGNOSIS — M79.18 MYOFASCIAL PAIN: ICD-10-CM

## 2023-02-23 DIAGNOSIS — G89.29 CHRONIC BILATERAL LOW BACK PAIN, UNSPECIFIED WHETHER SCIATICA PRESENT: Primary | ICD-10-CM

## 2023-03-01 ENCOUNTER — TELEPHONE (OUTPATIENT)
Dept: PAIN MEDICINE | Facility: MEDICAL CENTER | Age: 63
End: 2023-03-01

## 2023-03-01 NOTE — TELEPHONE ENCOUNTER
Pt called stating that she received a call from prior auth team for her MRI stating she does not have insurance   Pt states she called insurance and confirmed it is active    Pt would like to know what is going on     Pt can be reached at [phone]

## 2023-03-01 NOTE — TELEPHONE ENCOUNTER
Left detailed message as per EMIL advising pt that msg has been sent to MRI auth team stating insur has been verified w/ both [employer] clerical team and pt  CB# provided for any questions

## 2023-03-09 ENCOUNTER — HOSPITAL ENCOUNTER (OUTPATIENT)
Dept: RADIOLOGY | Facility: HOSPITAL | Age: 63
Discharge: HOME | End: 2023-03-09

## 2023-03-09 DIAGNOSIS — M54.50 CHRONIC BILATERAL LOW BACK PAIN, UNSPECIFIED WHETHER SCIATICA PRESENT: ICD-10-CM

## 2023-03-09 DIAGNOSIS — G89.29 CHRONIC BILATERAL LOW BACK PAIN, UNSPECIFIED WHETHER SCIATICA PRESENT: ICD-10-CM

## 2023-03-14 ENCOUNTER — TELEPHONE (OUTPATIENT)
Dept: PAIN MEDICINE | Facility: CLINIC | Age: 63
End: 2023-03-14

## 2023-03-14 NOTE — TELEPHONE ENCOUNTER
S/W pt and advised of the same, pt has pain at times and robaxin does not help, offered pt [facility] to discuss treatment plan, pt to call office to schedule f/u  Pt verbalized understanding and appreciative of call

## 2023-03-14 NOTE — TELEPHONE ENCOUNTER
----- Message from Martha Moraes, [address] sent at 3/14/2023 11:17 AM EDT -----  MRI of the lumbar spine reveals mild to moderate arthritis from L3-4 to L5-S1 without any central or foraminal narrowing  Therei s no nerve impingement

## 2023-03-14 NOTE — TELEPHONE ENCOUNTER
Caller: Gregory Postin     Doctor: Raissa Vuong    Reason for call: patient returning nurses phone call     Transferred to nurse

## 2024-08-08 ENCOUNTER — TELEPHONE (OUTPATIENT)
Age: 64
End: 2024-08-08

## 2024-08-08 NOTE — TELEPHONE ENCOUNTER
Attempted to call dental office with no response. Line kept placing me on hold without ever speaking to anyone. Patient should take antibiotic prior to dental appointment due to hx of multiple total joint replacements. We recommend amoxicillin 2000 mg one hour prior to appts.

## 2024-08-08 NOTE — TELEPHONE ENCOUNTER
Caller: lang from [facility]     Doctor: dina    Reason for call: patient is currently at the dentist and they would like to know if patient needs pre med and if not can a clearance form be faxed to [phone]    Call back#: [phone]

## 2025-02-26 ENCOUNTER — TELEPHONE (OUTPATIENT)
Age: 65
End: 2025-02-26

## 2025-02-26 NOTE — TELEPHONE ENCOUNTER
Caller: Lary     Doctor: Dr. Stover    Reason for call: Having  dental work done today at 2 and was instructed by dentist to call and confirm if she needs to take antibiotic before visit. [facility] 8/2022    Call back#: [phone]    Dental office of [facility] # [phone]

## 2025-02-26 NOTE — TELEPHONE ENCOUNTER
Spoke with Dr. Stover's PA-C.     I called & spoke to the dentist office.   Drew's message was relayed to the dental office. Acknowledged.

## (undated) DEVICE — COBAN 6 IN STERILE

## (undated) DEVICE — SAW BLADE OSCILLATING BRAZOL 167

## (undated) DEVICE — 4-PORT MANIFOLD: Brand: NEPTUNE 2

## (undated) DEVICE — FRAZIER SUCTION INSTRUMENT 18 FR W/OBTURATOR, NO CONTROL VENT: Brand: FRAZIER

## (undated) DEVICE — SYRINGE 30ML LL

## (undated) DEVICE — SPECIMEN CONTAINER STERILE PEEL PACK

## (undated) DEVICE — SURGICAL GOWN, XL SMARTSLEEVE: Brand: CONVERTORS

## (undated) DEVICE — DRAPE SHEET X-LG

## (undated) DEVICE — CAPIT HIP COP - ACTIS ONLY

## (undated) DEVICE — GLOVE SRG BIOGEL PI ORTHOPEDIC 7

## (undated) DEVICE — NEEDLE 18 G X 1 1/2

## (undated) DEVICE — CAST PADDING 4 IN SYNTHETIC NON-STRL

## (undated) DEVICE — SOFT SILICONE HYDROCELLULAR SACRUM DRESSING WITH LOCK AWAY LAYER: Brand: ALLEVYN LIFE SACRUM (LARGE) PACK OF 10

## (undated) DEVICE — BIPOLAR SEALER 23-113-1 AQM 2.3: Brand: AQUAMANTYS™

## (undated) DEVICE — GLOVE INDICATOR PI UNDERGLOVE SZ 6.5 BLUE

## (undated) DEVICE — TRAY FOLEY 16FR URIMETER SURESTEP

## (undated) DEVICE — 3M™ TEGADERM™ TRANSPARENT FILM DRESSING FRAME STYLE, 1627, 4 IN X 10 IN (10 CM X 25 CM), 20/CT 4CT/CASE: Brand: 3M™ TEGADERM™

## (undated) DEVICE — BETHLEHEM TOTAL HIP, KIT: Brand: CARDINAL HEALTH

## (undated) DEVICE — ADHESIVE SKIN CLOSR DERMABOND PRINEO

## (undated) DEVICE — HOOD: Brand: FLYTE, SURGICOOL

## (undated) DEVICE — SUT VICRYL 2-0 CT-1 36 IN J945H

## (undated) DEVICE — DRAPE C-ARM X-RAY

## (undated) DEVICE — 3M™ STERI-DRAPE™ U-DRAPE 1015: Brand: STERI-DRAPE™

## (undated) DEVICE — SUT MONOCRYL 3-0 PS-2 27 IN Y427H

## (undated) DEVICE — SKIN MARKER DUAL TIP WITH RULER CAP, FLEXIBLE RULER AND LABELS: Brand: DEVON

## (undated) DEVICE — PLUMEPEN PRO 10FT

## (undated) DEVICE — HEAVY DUTY TABLE COVER: Brand: CONVERTORS

## (undated) DEVICE — GLOVE SRG BIOGEL ORTHOPEDIC 6.5

## (undated) DEVICE — SUT ETHIBOND 5 V-37 30 IN MB66G

## (undated) DEVICE — SUT VICRYL 1 CTX 36 IN J977H

## (undated) DEVICE — 3M™ IOBAN™ 2 ANTIMICROBIAL INCISE DRAPE 6648EZ: Brand: IOBAN™ 2

## (undated) DEVICE — ELECTRODE BLADE E-Z CLEAN 6.5IN -0014

## (undated) DEVICE — POSITIONER HANA TABLE PACK

## (undated) DEVICE — GLOVE SRG BIOGEL 6.5

## (undated) DEVICE — THE SIMPULSE SOLO SYSTEM WITH ULTREX RETRACTABLE SPLASH SHIELD TIP: Brand: SIMPULSE SOLO

## (undated) DEVICE — SCD SEQUENTIAL COMPRESSION COMFORT SLEEVE MEDIUM KNEE LENGTH: Brand: KENDALL SCD

## (undated) DEVICE — ARTHROSCOPY FLOOR MAT

## (undated) DEVICE — GAUZE SPONGES,16 PLY: Brand: CURITY

## (undated) DEVICE — 450 ML BOTTLE OF 0.05% CHLORHEXIDINE GLUCONATE IN 99.95% STERILE WATER FOR IRRIGATION, USP AND APPLICATOR.: Brand: IRRISEPT ANTIMICROBIAL WOUND LAVAGE